# Patient Record
Sex: MALE
[De-identification: names, ages, dates, MRNs, and addresses within clinical notes are randomized per-mention and may not be internally consistent; named-entity substitution may affect disease eponyms.]

---

## 2017-07-09 ENCOUNTER — HOSPITAL ENCOUNTER (OUTPATIENT)
Dept: HOSPITAL 42 - ED | Age: 52
Setting detail: OBSERVATION
LOS: 2 days | Discharge: HOME | End: 2017-07-11
Attending: HOSPITALIST | Admitting: INTERNAL MEDICINE
Payer: COMMERCIAL

## 2017-07-09 VITALS — BODY MASS INDEX: 27.5 KG/M2

## 2017-07-09 DIAGNOSIS — G89.29: ICD-10-CM

## 2017-07-09 DIAGNOSIS — K52.9: ICD-10-CM

## 2017-07-09 DIAGNOSIS — I51.7: ICD-10-CM

## 2017-07-09 DIAGNOSIS — K64.9: ICD-10-CM

## 2017-07-09 DIAGNOSIS — N28.1: ICD-10-CM

## 2017-07-09 DIAGNOSIS — I26.99: Primary | ICD-10-CM

## 2017-07-09 DIAGNOSIS — F11.10: ICD-10-CM

## 2017-07-09 DIAGNOSIS — T40.605A: ICD-10-CM

## 2017-07-09 DIAGNOSIS — M25.511: ICD-10-CM

## 2017-07-09 DIAGNOSIS — K59.03: ICD-10-CM

## 2017-07-09 DIAGNOSIS — F41.0: ICD-10-CM

## 2017-07-09 DIAGNOSIS — R63.4: ICD-10-CM

## 2017-07-09 DIAGNOSIS — Z86.11: ICD-10-CM

## 2017-07-09 DIAGNOSIS — K30: ICD-10-CM

## 2017-07-09 DIAGNOSIS — Z87.891: ICD-10-CM

## 2017-07-09 DIAGNOSIS — F10.10: ICD-10-CM

## 2017-07-09 DIAGNOSIS — K57.30: ICD-10-CM

## 2017-07-09 DIAGNOSIS — M19.012: ICD-10-CM

## 2017-07-09 LAB
ALBUMIN SERPL-MCNC: 4.2 G/DL (ref 3–4.8)
ALBUMIN/GLOB SERPL: 1.6 {RATIO} (ref 1.1–1.8)
ALT SERPL-CCNC: 29 U/L (ref 7–56)
APPEARANCE UR: CLEAR
APTT BLD: 27.2 SECONDS (ref 23.7–30.8)
AST SERPL-CCNC: 29 U/L (ref 15–59)
BASOPHILS # BLD AUTO: 0.01 K/MM3 (ref 0–2)
BASOPHILS NFR BLD: 0.2 % (ref 0–3)
BILIRUB DIRECT SERPL-MCNC: 0.3 MG/DL (ref 0–0.4)
BILIRUB UR-MCNC: NEGATIVE MG/DL
BNP SERPL-MCNC: 89.4 PG/ML (ref 0–450)
BUN SERPL-MCNC: 9 MG/DL (ref 7–21)
CALCIUM SERPL-MCNC: 9.6 MG/DL (ref 8.4–10.5)
COLOR UR: YELLOW
EOSINOPHIL # BLD: 0 10*3/UL (ref 0–0.7)
EOSINOPHIL NFR BLD: 0.3 % (ref 1.5–5)
ERYTHROCYTE [DISTWIDTH] IN BLOOD BY AUTOMATED COUNT: 13.4 % (ref 11.5–14.5)
GFR NON-AFRICAN AMERICAN: > 60
GLUCOSE UR STRIP-MCNC: NEGATIVE MG/DL
GRANULOCYTES # BLD: 3.78 10*3/UL (ref 1.4–6.5)
GRANULOCYTES NFR BLD: 66 % (ref 50–68)
HGB BLD-MCNC: 13.1 GM/DL (ref 14–18)
INR PPP: 1.01 (ref 0.93–1.08)
LEUKOCYTE ESTERASE UR-ACNC: NEGATIVE LEU/UL
LIPASE SERPL-CCNC: 49 U/L (ref 23–300)
LYMPHOCYTES # BLD: 1.6 10*3/UL (ref 1.2–3.4)
LYMPHOCYTES NFR BLD AUTO: 27.6 % (ref 22–35)
MAGNESIUM SERPL-MCNC: 2.1 MG/DL (ref 1.7–2.2)
MCH RBC QN AUTO: 28.7 PG (ref 25–35)
MCHC RBC AUTO-ENTMCNC: 34.1 G/DL (ref 31–37)
MCV RBC AUTO: 84 FL (ref 80–105)
MONOCYTES # BLD AUTO: 0.3 10*3/UL (ref 0.1–0.6)
MONOCYTES NFR BLD: 5.9 % (ref 1–6)
PH UR STRIP: 8.5 [PH] (ref 4.7–8)
PLATELET # BLD: 177 10^3/UL (ref 120–450)
PMV BLD AUTO: 9.3 FL (ref 7–11)
PROT UR STRIP-MCNC: NEGATIVE MG/DL
PROTHROMBIN TIME: 10.9 SECONDS (ref 9.9–11.8)
RBC # BLD AUTO: 4.57 10^6/UL (ref 3.5–6.1)
RBC # UR STRIP: NEGATIVE /UL
SP GR UR STRIP: 1.01 (ref 1–1.03)
TROPONIN I SERPL-MCNC: < 0.01 NG/ML
URINE NITRATE: NEGATIVE
UROBILINOGEN UR STRIP-ACNC: 0.2 E.U./DL
WBC # BLD AUTO: 5.7 10^3/UL (ref 4.5–11)

## 2017-07-09 PROCEDURE — 85610 PROTHROMBIN TIME: CPT

## 2017-07-09 PROCEDURE — 73200 CT UPPER EXTREMITY W/O DYE: CPT

## 2017-07-09 PROCEDURE — 93005 ELECTROCARDIOGRAM TRACING: CPT

## 2017-07-09 PROCEDURE — 73030 X-RAY EXAM OF SHOULDER: CPT

## 2017-07-09 PROCEDURE — 82550 ASSAY OF CK (CPK): CPT

## 2017-07-09 PROCEDURE — 85300 ANTITHROMBIN III ACTIVITY: CPT

## 2017-07-09 PROCEDURE — 80053 COMPREHEN METABOLIC PANEL: CPT

## 2017-07-09 PROCEDURE — 99285 EMERGENCY DEPT VISIT HI MDM: CPT

## 2017-07-09 PROCEDURE — 83615 LACTATE (LD) (LDH) ENZYME: CPT

## 2017-07-09 PROCEDURE — 83880 ASSAY OF NATRIURETIC PEPTIDE: CPT

## 2017-07-09 PROCEDURE — 83735 ASSAY OF MAGNESIUM: CPT

## 2017-07-09 PROCEDURE — 96375 TX/PRO/DX INJ NEW DRUG ADDON: CPT

## 2017-07-09 PROCEDURE — 96361 HYDRATE IV INFUSION ADD-ON: CPT

## 2017-07-09 PROCEDURE — 85306 CLOT INHIBIT PROT S FREE: CPT

## 2017-07-09 PROCEDURE — 86147 CARDIOLIPIN ANTIBODY EA IG: CPT

## 2017-07-09 PROCEDURE — 81003 URINALYSIS AUTO W/O SCOPE: CPT

## 2017-07-09 PROCEDURE — 84484 ASSAY OF TROPONIN QUANT: CPT

## 2017-07-09 PROCEDURE — 86146 BETA-2 GLYCOPROTEIN ANTIBODY: CPT

## 2017-07-09 PROCEDURE — 81241 F5 GENE: CPT

## 2017-07-09 PROCEDURE — 86148 ANTI-PHOSPHOLIPID ANTIBODY: CPT

## 2017-07-09 PROCEDURE — 85246 CLOT FACTOR VIII VW ANTIGEN: CPT

## 2017-07-09 PROCEDURE — 85027 COMPLETE CBC AUTOMATED: CPT

## 2017-07-09 PROCEDURE — 96374 THER/PROPH/DIAG INJ IV PUSH: CPT

## 2017-07-09 PROCEDURE — 83690 ASSAY OF LIPASE: CPT

## 2017-07-09 PROCEDURE — 74177 CT ABD & PELVIS W/CONTRAST: CPT

## 2017-07-09 PROCEDURE — 85025 COMPLETE CBC W/AUTO DIFF WBC: CPT

## 2017-07-09 PROCEDURE — 84443 ASSAY THYROID STIM HORMONE: CPT

## 2017-07-09 PROCEDURE — 82248 BILIRUBIN DIRECT: CPT

## 2017-07-09 PROCEDURE — 36415 COLL VENOUS BLD VENIPUNCTURE: CPT

## 2017-07-09 PROCEDURE — 85730 THROMBOPLASTIN TIME PARTIAL: CPT

## 2017-07-09 PROCEDURE — 85303 CLOT INHIBIT PROT C ACTIVITY: CPT

## 2017-07-09 PROCEDURE — 71260 CT THORAX DX C+: CPT

## 2017-07-09 PROCEDURE — 85305 CLOT INHIBIT PROT S TOTAL: CPT

## 2017-07-09 RX ADMIN — HEPARIN SODIUM AND DEXTROSE PRN MLS/HR: 5000; 5 INJECTION INTRAVENOUS at 20:12

## 2017-07-09 NOTE — ED PDOC
Arrival/HPI





- General


Chief Complaint: Shortness Of Breath


Time Seen by Provider: 07/09/17 15:04


Historian: Patient





- History of Present Illness


Narrative History of Present Illness (Text): 





07/09/17 15:25


Edi Cordova is a 52 year old male, with a history of right shoulder surgery 

on 15 mg percocet, and anxiety on Xanax, presents to the emergency department 

complaining of dizziness and shortness of breath for past 2-3 days. Patient 

became dizzy yesterday while taking a shower and states he twisted his shoulder 

after holding on to a handle to prevent the fall.  Denies any LOC, head trauma, 

or weakness/numbness of extremity. Patient finished a course of Zithromax today 

for bronchitis. Patient also reports that he has difficulty breathing and chest 

pain which is worsened with deep inspiration. He has a recent stress test which

, according to patient, showed" enlarged left ventricle." He also notes of 

hypotension with a pressure of 88/50 which was measured a week ago at PMD's 

office. He also reports of decreased appetite over the last few months, and 

informs of a weight loss of 20 pounds in 2 months. States he feels depressed, 

but denies any suicidal or homicidal ideation. Patient was started on Lexapro 

for depression by PMD, but was discontinued due to worsening panic attacks. 

Denies fever, chills, headache, nausea, vomiting, urinary symptoms, or any 

other complaints at this time. 





Time/Duration: < week (2-3 days )


Symptom Onset: Gradual


Severity Level: Mild


Context: Home





Past Medical History





- Provider Review


Nursing Documentation Reviewed: Yes





- Infectious Disease


Hx of Infectious Diseases: None





- Tetanus Immunization


Tetanus Immunization: Unknown





- Past Medical History


Past Medical History: No Previous





- Cardiac


Hx Cardiac Disorders: No





- Pulmonary


Hx Respiratory Disorders: No


Hx Bronchitis: Yes





- Neurological


Hx Neurological Disorder: No





- HEENT


Hx HEENT Disorder: No





- Renal


Hx Renal Disorder: No





- Endocrine/Metabolic


Hx Endocrine Disorders: No





- Hematological/Oncological


Hx Blood Disorders: No





- Integumentary


Hx Dermatological Disorder: No





- Musculoskeletal/Rheumatological


Hx Musculoskeletal Disorders: Yes


Other/Comment: left torn rotator cuff





- Gastrointestinal


Hx Gastrointestinal Disorders: No





- Genitourinary/Gynecological


Hx Genitourinary Disorders: No





- Psychiatric


Hx Psychophysiologic Disorder: No


Hx Depression: No


Hx Emotional Abuse: No


Hx Physical Abuse: No


Hx Substance Use: No





- Past Surgical History


Past Surgical History: No Previous





- Surgical History


Hx Cardiac Catheterization: Yes


Hx Eye Surgery: Yes (childhood)


Hx Tonsillectomy: Yes


Other/Comment: hemorrhoid surgery, 5 avusion fx, right rotator cuff surgery





- Anesthesia


Hx Anesthesia: Yes


Hx Anesthesia Reactions: No





- Suicidal Assessment


Feels Threatened In Home Enviroment: No





Family/Social History





- Physician Review


Nursing Documentation Reviewed: Yes


Family/Social History: No Known Family HX


Smoking Status: Current Some Days Smoker


Hx Alcohol Use: Yes


Amount per day: 2


Hx Substance Use: No


Hx Substance Use Treatment: No





Allergies/Home Meds


Allergies/Adverse Reactions: 


Allergies





amoxicillin trihydrate [From Augmentin] Allergy (Verified 07/09/17 15:10)


 ANAPHYLAXIS


potassium clavulanate [From Augmentin] Allergy (Verified 07/09/17 15:10)


 ANAPHYLAXIS


prednimustine Allergy (Verified 07/09/17 18:26)


 RASH


prednisolone Allergy (Verified 07/09/17 18:26)


 RASH


prednisolone acetate, micronized Allergy (Verified 07/09/17 18:26)


 RASH


prednisone Allergy (Verified 07/09/17 18:26)


 RASH


prednylidene Allergy (Verified 07/09/17 18:26)


 RASH


prednison Allergy (Uncoded 07/09/17 18:26)


 RASH








Home Medications: 


 Home Meds











 Medication  Instructions  Recorded  Confirmed


 


ALPRAZolam HALF TABLET [Xanax] 1 mg PO DAILY 02/27/17 07/09/17


 


Oxycodone HCl [Roxicodone] 15 mg PO Q6 PRN 02/27/17 07/09/17














Review of Systems





- Physician Review


All systems were reviewed & negative as marked: Yes





- Review of Systems


Constitutional: Normal.  absent: Fatigue, Fevers


Respiratory: SOB.  absent: Cough, Sputum


Cardiovascular: Chest Pain, Other (near syncope).  absent: Palpitations


Gastrointestinal: Diarrhea, Appetite Changes (Loss of appetite; weight loss ).  

absent: Abdominal Pain, Nausea, Vomiting


Genitourinary Male: Normal.  absent: Dysuria


Musculoskeletal: Back Pain, Other (left shoulder pain )


Neurological: Dizziness.  absent: Focal Weakness


Psychiatric: Depression.  absent: Suicidal Ideation





Physical Exam


Vital Signs Reviewed: Yes


Vital Signs











  Temp Pulse Resp BP Pulse Ox


 


 07/09/17 17:40   48 L  18  130/65  98


 


 07/09/17 16:39   56 L  18  131/69  98


 


 07/09/17 16:27   56 L  18  152/62 H  100


 


 07/09/17 15:06  97.8 F  76  14  156/66 H  100











Temperature: Afebrile


Blood Pressure: Hypertensive


Pulse: Regular


Respiratory Rate: Normal


Appearance: Positive for: Non-Toxic, Uncomfortable


Pain Distress: None


Mental Status: Positive for: Alert and Oriented X 3





- Systems Exam


Head: Present: Atraumatic, Normocephalic


Pupils: Present: PERRL


Conjunctiva: Present: Normal


Mouth: Present: Moist Mucous Membranes


Pharnyx: Present: Normal.  No: ERYTHEMA, EXUDATE


Respiratory/Chest: Present: Clear to Auscultation, Good Air Exchange.  No: 

Respiratory Distress, Accessory Muscle Use


Cardiovascular: Present: Regular Rate and Rhythm, Normal S1, S2.  No: Murmurs


Abdomen: Present: Normal Bowel Sounds.  No: Tenderness, Distention, Peritoneal 

Signs, Rebound, Guarding


Upper Extremity: Present: Normal ROM (Full active and passive range of motion 

at the shoulder ), NORMAL PULSES, Tenderness (Tenderness to palpation of left 

posterior shoulder ), Neurovascularly Intact.  No: Cyanosis, Edema, Swelling, 

Temperature Abnormalties, Deformity


Lower Extremity: Present: Normal Inspection.  No: Edema


Neurological: Present: GCS=15, CN II-XII Intact, Speech Normal, Motor Func 

Grossly Intact, Normal Sensory Function


Skin: Present: Warm, Dry, Normal Color.  No: Rashes


Psychiatric: Present: Alert, Oriented x 3, Normal Insight, Normal Concentration





Medical Decision Making


ED Course and Treatment: 





07/09/17 15:43


Impression: A 52 year old  male who presents to the emergency department 

complaining of dizziness, left shoulder, shortness of breath and chest pain for 

past few days. 








Differential Diagnosis include but are not limited to:  





Malignancy vs PE vs anxiety





Plan:


-- CT chest, Abd/Pel


-- Labs, cardiac enzymes


-- TSH


-- Percocet


-- Pepcid


-- Toradol


-- IVF 


-- Left shoudler X-ray


-- Urinalysis


-- Reassess and disposition


-- Spoke with his pmd, Dr. Yancey - plan will be to f/u results and admit.


Progress Notes:


07/09/17 18:59








 


 EXAM:  


   CT Abdomen and Pelvis With Intravenous Contrast  


 


 FINDINGS:  


 


  ABDOMEN:  


   Liver:  A few small calcifications are visualized within the liver,   


 suggestive of granulomatous disease. No mass.  


   Gallbladder and bile ducts:  No calcified stones.  No ductal dilation.  


   Pancreas:  There is hypodense fatty infiltration of the head of the 

pancreas.  


   Spleen:  No splenomegaly.  


   Adrenals:  No mass.  


   Kidneys and ureters:  There is an exophytic hypodense left renal cyst   


 measuring 5.4 cm in diameter.  No hydronephrosis bilaterally.  


   Stomach and bowel:  There is wall thickening of the sigmoid colon and rectum

,   


 suggestive of proctocolitis. Colonic diverticula are also visualized.    


 Additional pathology cannot be excluded.  


   Appendix:  No findings to suggest acute appendicitis.  


 


  PELVIS:  


   Bladder:  No mass.  


   Reproductive:  A tiny calcification is visualized within the prostate.  


 


  ABDOMEN and PELVIS:  


   Intraperitoneal space:  No free air.  


   Bones/joints:  Hypertrophic degenerative changes are noted within the spine.

  


   Vasculature:  No abdominal aortic aneurysm.  


   Lymph nodes:  There is no significant retroperitoneal or intrapelvic   


 lymphadenopathy.  


 


 IMPRESSION:       


 1.  There is wall thickening of the sigmoid colon and rectum, suggestive of   


 proctocolitis.  Colonic diverticula are also visualized.  Additional pathology

   


 cannot be excluded.  If further evaluation is clinically indicated, 

colonoscopy   


 is recommended.  


 2.  There is an exophytic hypodense left renal cyst measuring 5.4 cm in   


 diameter.  


 3.  Additional CT findings described above.  


 _______________________________________________  


 


 EXAM:  


   CT Chest With Intravenous Contrast  


 


 FINDINGS:  


   Lungs:  On series 4 image 61, there is a 3-4mm left upper lobe nodule.    


 Within the right middle lobe of the lung on series 4 image 92, there is a 9 mm

   


 hyperdense calcified nodule.  No lung mass.  Within the left lower lobe on   


 series 4 image 87, there is an equivocal 3-4 mm nodule.  Small biapical bullae

   


 are visualized.  


   Pleural space:  No pneumothorax.  No significant effusion.  


   Heart:  There is a small pericardial effusion anteriorly.  Fluid is   


 visualized within the superior pericardial recess.  


   Bones/joints:  Hypertrophic degenerative changes are noted within the spine.

  


   Vasculature:  There is absence of normal enhancement within segmental   


 branches to the right middle lobe, concerning for pulmonary embolism.  No 

acute   


 central pulmonary embolism.  


   Lymph nodes:  No significant mediastinal lymphadenopathy.  Small hilar lymph

   


 nodes are identified, without significant lymphadenopathy.  


 


 IMPRESSION:       


 1.  There is absence of normal enhancement within segmental branches to the   


 right middle lobe, concerning for pulmonary embolism.    


 2.  Small noncalcified pulmonary nodule(s). If this patient is at low risk for

   


 a primary malignancy, then no follow-up is required.  If this patient is at   


 high risk for a primary maligancy, then a follow-up noncontrast chest CT is   


 recommended at 12 months.  If unchanged at that time, then no follow-up is   


 required.  


 Fleischner Society Recommendations. Incidental Pulmonary Nodule Follow-up.   


 Radiology, 2005 Nov;237(2):395-400.  


 3.  There is a small pericardial effusion anteriorly.    


 4.  Within the right middle lobe of the lung on series 4 image 92, there is a 

9   


 mm hyperdense calcified nodule.   


 


 


EXAM:  


   CT Abdomen and Pelvis With Intravenous Contrast  





 FINDINGS:  


 


  ABDOMEN:  


   Liver:  A few small calcifications are visualized within the liver,   


 suggestive of granulomatous disease. No mass.  


   Gallbladder and bile ducts:  No calcified stones.  No ductal dilation.  


   Pancreas:  There is hypodense fatty infiltration of the head of the 

pancreas.  


   Spleen:  No splenomegaly.  


   Adrenals:  No mass.  


   Kidneys and ureters:  There is an exophytic hypodense left renal cyst   


 measuring 5.4 cm in diameter.  No hydronephrosis bilaterally.  


   Stomach and bowel:  There is wall thickening of the sigmoid colon and rectum

,   


 suggestive of proctocolitis. Colonic diverticula are also visualized.    


 Additional pathology cannot be excluded.  


   Appendix:  No findings to suggest acute appendicitis.  


 


  PELVIS:  


   Bladder:  No mass.  


   Reproductive:  A tiny calcification is visualized within the prostate.  


 


  ABDOMEN and PELVIS:  


   Intraperitoneal space:  No free air.  


   Bones/joints:  Hypertrophic degenerative changes are noted within the spine.

  


   Vasculature:  No abdominal aortic aneurysm.  


   Lymph nodes:  There is no significant retroperitoneal or intrapelvic   


 lymphadenopathy.  


 


 IMPRESSION:       


 1.  There is wall thickening of the sigmoid colon and rectum, suggestive of   


 proctocolitis.  Colonic diverticula are also visualized.  Additional pathology

   


 cannot be excluded.  If further evaluation is clinically indicated, 

colonoscopy   


 is recommended.  


 2.  There is an exophytic hypodense left renal cyst measuring 5.4 cm in   


 diameter.  


 3.  Additional CT findings described above.  


 _______________________________________________  


 


 EXAM:  


   CT Chest With Intravenous Contrast  


 


 FINDINGS:  


   Lungs:  On series 4 image 61, there is a 3-4mm left upper lobe nodule.    


 Within the right middle lobe of the lung on series 4 image 92, there is a 9 mm

   


 hyperdense calcified nodule.  No lung mass.  Within the left lower lobe on   


 series 4 image 87, there is an equivocal 3-4 mm nodule.  Small biapical bullae

   


 are visualized.  


   Pleural space:  No pneumothorax.  No significant effusion.  


   Heart:  There is a small pericardial effusion anteriorly.  Fluid is   


 visualized within the superior pericardial recess.  


   Bones/joints:  Hypertrophic degenerative changes are noted within the spine.

  


   Vasculature:  There is absence of normal enhancement within segmental   


 branches to the right middle lobe, concerning for pulmonary embolism.  No 

acute   


 central pulmonary embolism.  


   Lymph nodes:  No significant mediastinal lymphadenopathy.  Small hilar lymph

   


 nodes are identified, without significant lymphadenopathy.  


 


 IMPRESSION:       


 1.  There is absence of normal enhancement within segmental branches to the   


 right middle lobe, concerning for pulmonary embolism.    


 2.  Small noncalcified pulmonary nodule(s). If this patient is at low risk for

   


 a primary malignancy, then no follow-up is required.  If this patient is at   


 high risk for a primary maligancy, then a follow-up noncontrast chest CT is   


 recommended at 12 months.  If unchanged at that time, then no follow-up is   


 required.  


 Fleischner Society Recommendations. Incidental Pulmonary Nodule Follow-up.   


 Radiology, 2005 Nov;237(2):395-400.  


 3.  There is a small pericardial effusion anteriorly.    


 4.  Within the right middle lobe of the lung on series 4 image 92, there is a 

9   


 mm hyperdense calcified nodule.   


 


 Dictated By:  Jareth Phelan MD, MD      


 Dictated Date/Time:  07/09/17 1850  


 Signed By: Jareth Richardson MD  


                                                 








07/09/17 19:27


Patient with noted history.  Given sob and weight loss, CT c/a/p ordered, 

showing PE and proctocolitis.  Will start on heparin and abx - will need to be 

admitted - will need pulmonary, hematology, and GI consult.  Case discussed 

with Dr. Arias who will admit the patient to the hospitalist service.  





- Lab Interpretations


Lab Results: 








 07/09/17 15:38 





 07/09/17 15:38 





 Lab Results





07/09/17 16:00: Urine Color Yellow, Urine Appearance Clear, Urine pH 8.5, Ur 

Specific Gravity 1.010, Urine Protein Negative, Urine Glucose (UA) Negative, 

Urine Ketones Negative, Urine Blood Negative, Urine Nitrate Negative, Urine 

Bilirubin Negative, Urine Urobilinogen 0.2, Ur Leukocyte Esterase Negative


07/09/17 15:38: TSH 3rd Generation 0.83


07/09/17 15:38: Sodium 142, Potassium 4.0, Chloride 106, Carbon Dioxide 27, 

Anion Gap 13, BUN 9, Creatinine 0.9, Est GFR (African Amer) > 60, Est GFR (Non-

Af Amer) > 60, Random Glucose 100, Calcium 9.6, Magnesium 2.1, Total Bilirubin 

0.5, Direct Bilirubin 0.3, AST 29, ALT 29, Alkaline Phosphatase 44, Lactate 

Dehydrogenase 318 L, Total Creatine Kinase 85, Troponin I < 0.01, NT-Pro-B 

Natriuret Pep 89.4, Total Protein 6.8, Albumin 4.2, Globulin 2.6, Albumin/

Globulin Ratio 1.6, Lipase 49


07/09/17 15:38: PT 10.9, INR 1.01, APTT 27.2


07/09/17 15:38: WBC 5.7, RBC 4.57, Hgb 13.1 L, Hct 38.4 L, MCV 84.0, MCH 28.7, 

MCHC 34.1, RDW 13.4, Plt Count 177, MPV 9.3, Gran % 66.0, Lymph % (Auto) 27.6, 

Mono % (Auto) 5.9, Eos % (Auto) 0.3 L, Baso % (Auto) 0.2, Gran # 3.78, Lymph # 

1.6, Mono # 0.3, Eos # 0.0, Baso # 0.01








I have reviewed the lab results: Yes





- RAD Interpretation


Narrative RAD Interpretations (Text): 





07/09/17 19:33


L shoulder: no acute findings


Radiology Orders: 








07/09/17 15:27


SHOULDER LEFT [RAD] Stat 





07/09/17 15:30


CHEST,ABD,PEL W/IV&PO CONTRAST [CT] Stat 











: Radiologist





- EKG Interpretation


EKG Interpretation (Text): 





07/09/17 19:32


NSR @ 68; no ST/T changes; normal intervals; normal axis.





- Medication Orders


Current Medication Orders: 








Heparin Sodium/Dextrose (Heparin 25,000 Units/250ml In D5w)  25,000 units in 

250 mls @ 17.023 mls/hr IV .[id number] PRN; Protocol; 18 UNITS/KG/HR


   PRN Reason: ADJUST RATE PER PROTOCOL





Discontinued Medications





Famotidine (Pepcid)  20 mg IVP STAT STA


   Stop: 07/09/17 15:32


   Last Admin: 07/09/17 16:03  Dose: 20 mg





Heparin Sodium (Porcine) (Heparin)  7,600 units 80 units/kg (7600 units) IV 

ONCE ONE


   PRN Reason: Protocol


   Stop: 07/09/17 19:21


Sodium Chloride (Sodium Chloride 0.9%)  1,000 mls @ 999 mls/hr IV .Q1H1M STA


   Stop: 07/09/17 16:31


   Last Admin: 07/09/17 16:01  Dose: 999 mls/hr





Iohexol (Omnipaque 240 (50 Ml)) Confirm Administered Dose 50 ml .ROUTE .STK-MED 

ONE


   Stop: 07/09/17 16:10


Iohexol (Omnipaque 350 100 Ml) Confirm Administered Dose 350 mg .ROUTE .STK-MED 

ONE


   Stop: 07/09/17 16:10


Iohexol (Omnipaque 350 150 Ml) Confirm Administered Dose 150 ml .ROUTE .STK-MED 

ONE


   Stop: 07/09/17 18:19


Ketorolac Tromethamine (Toradol)  30 mg IVP STAT STA


   Stop: 07/09/17 15:32


   Last Admin: 07/09/17 16:03 Dose:  Not Given


   Non-Admin Reason: Patient Refused





Morphine Sulfate (Morphine)  4 mg IVP STAT STA


   Stop: 07/09/17 16:27


   Last Admin: 07/09/17 16:32  Dose: 4 mg





Oxycodone/Acetaminophen (Percocet 10/325 Mg Tab)  1 tab PO STAT STA


   Stop: 07/09/17 15:32


   Last Admin: 07/09/17 16:01  Dose: 1 tab











- Scribe Statement


The provider has reviewed the documentation as recorded by the [place]ia Vazquez 


Provider Attestation: 


Roxane Vazquez 





Provider Scribe Attestation:


All medical record entries made by the Bruceibflor were at my direction and 

personally dictated by me. I have reviewed the chart and agree that the record 

accurately reflects my personal performance of the history, physical exam, 

medical decision making, and the department course for this patient. I have 

also personally directed, reviewed, and agree with the discharge instructions 

and disposition.











Disposition/Present on Arrival





- Present on Arrival


Any Indicators Present on Arrival: No


History of DVT/PE: No


History of Uncontrolled Diabetes: No


Urinary Catheter: No


History of Decub. Ulcer: No


History Surgical Site Infection Following: None





- Disposition


Have Diagnosis and Disposition been Completed?: Yes


Diagnosis: 


 Pulmonary embolism, Near syncope, Colitis





Disposition: HOSPITALIZED


Disposition Time: 19:15


Patient Plan: Admission


Condition: FAIR


Referrals: 


Lamberto Yancey MD [Primary Care Provider] - Follow up with primary

## 2017-07-09 NOTE — CP.PCM.HP
<RenettaNba - Last Filed: 07/10/17 07:05>





History of Present Illness





- History of Present Illness


History of Present Illness: 








CC: Shoulder pain and SOB





Mr. Romero is a 53 y/o male with a pertinent PMHx of tobacco abuse and right 

sided shoulder pain for which he takes Percocet 15, who presented with a c/o 

SOB of 2 months duration on and off, worsening over the past 2-3 days.  Mr. Romero was recently seen for bronchitis and finished a course of ABx (

Zithromax) on the day of admission (7/09/17).  Patient stated that the pain got 

worse with inspiration and that nothing made it better.  Patient denied a 

history of clots, as well as a history of fever, chills, nausea, vomiting, 

urinary symptoms, or headaches.





Patient further complained of pain in his right shoulder s/p catching a fall in 

the shower on Saturday.  Patient received dilaudid in the ED for this pain.  

Patient denied any head trauma, focal weakness, loss of sensation, and any 

other symptoms.





Of note, patient had a recent stress test which showed an enlarged left 

ventricle, per the patient.  Cardio is on consult.  Finally, patient also 

stated that he lost 80 pounds, unintentionally over the past year.  Heme onc is 

on consult





PMHx: As above


PSHx: Rotator cuff surgery


All: Prednisone, Augmentin


SocHx: .5ppd for 30 years; cocaine, heroin in the past; drinks occasionally


Meds: Percocet 15 mg; Xanax


FamHx: Non-contributory





Present on Admission





- Present on Admission


Any Indicators Present on Admission: No





Review of Systems





- Review of Systems


Review of Systems: 





Constitutional: pt denies fever, chills, generalized weakness


ENT: pt denies dysphagia, ofalgia, hearing deficit, rhinorrhea


Eyes: pt denies sudden loss of vision, diplopia, blurred vision


MSK: + shoulder joint pain, denies extremity cramping


Cardio: +sob, +cp; pt deniesheart murmur; see hpi


Pulm: +sob, pt denies cough, hemoptysis, wheeze


GI: pt denies loss of appetite, abdominal pain, constipation, melena, n/v/d


: pt denies burning on urination, urinary frequency, hematuria, urinary 

urgency


Neuro: pt denies paresis, paresthesia, dizziness, ha, numbness, tingling


Derm: pt denies skin changes, lesions, nail changes


Endo: pt denies intolerance to heat/cold, diaphoresis, night sweats, polydipsia


Psych: +anxiety; pt denies depression, mood changes





Past Patient History





- Infectious Disease


Hx of Infectious Diseases: None





- Tetanus Immunizations


Tetanus Immunization: Unknown





- Past Medical History & Family History


Past Medical History?: Yes





- Past Social History


Smoking Status: Current Some Days Smoker





- CARDIAC


Hx Cardiac Disorders: No





- PULMONARY


Hx Respiratory Disorders: No


Hx Bronchitis: Yes





- NEUROLOGICAL


Hx Neurological Disorder: No





- HEENT


Hx HEENT Problems: No





- RENAL


Hx Chronic Kidney Disease: No





- ENDOCRINE/METABOLIC


Hx Endocrine Disorders: No





- HEMATOLOGICAL/ONCOLOGICAL


Hx Blood Disorders: No





- INTEGUMENTARY


Hx Dermatological Problems: No





- MUSCULOSKELETAL/RHEUMATOLOGICAL


Hx Falls: No





- GASTROINTESTINAL


Hx Gastrointestinal Disorders: No





- GENITOURINARY/GYNECOLOGICAL


Hx Genitourinary Disorders: No





- PSYCHIATRIC


Hx Psychophysiologic Disorder: No


Hx Depression: No


Hx Emotional Abuse: No


Hx Physical Abuse: No





- SURGICAL HISTORY


Hx Cardiac Catheterization: Yes


Other/Comment: hemorrhoid surgery, 5 avusion fx, right rotator cuff surgery





- ANESTHESIA


Hx Anesthesia: Yes


Hx Anesthesia Reactions: No





Meds


Allergies/Adverse Reactions: 


 Allergies











Allergy/AdvReac Type Severity Reaction Status Date / Time


 


amoxicillin trihydrate Allergy  ANAPHYLAXIS Verified 07/09/17 15:10





[From Augmentin]     


 


potassium clavulanate Allergy  ANAPHYLAXIS Verified 07/09/17 15:10





[From Augmentin]     


 


prednimustine Allergy  RASH Verified 07/09/17 18:26


 


prednisolone Allergy  RASH Verified 07/09/17 18:26


 


prednisolone acetate, Allergy  RASH Verified 07/09/17 18:26





micronized     


 


prednisone Allergy  RASH Verified 07/09/17 18:26


 


prednylidene Allergy  RASH Verified 07/09/17 18:26


 


prednison Allergy  RASH Uncoded 07/09/17 18:26














Physical Exam





- Constitutional


Additional comments: 





VS as above


Constitutional: a&o x 4, nad


Head and Neck: neck supple, no jvd, trachea midline, carotid midline, no 

cervical/head mass


Eyes: elias, nonicteric sclera, eom intact


ENT: auditory acuity grossly intact, throat not congested, no nasal deformity


Cardio: rrr, no m/r/g, no carotid bruit, nml s1, s2


Pulm: no accessory muscle use, equal nml breath sounds bilaterally, ctab


Abd: s/nt/nd, nbs x 4 q, no palpable masses


Derm: no rashes, no ulcers, no lesions


Extr: no edema, no cyanosis, no calf tenderness, no lesions, no varicosities


Neuro: cn II-XII grossly intact, ue and le 3+ muscle strength bilaterally, no 

los ue, le bilaterally and core





Results





- Vital Signs


Recent Vital Signs: 





 Last Vital Signs











Temp  97.8 F   07/09/17 15:06


 


Pulse  48 L  07/09/17 17:40


 


Resp  18   07/09/17 20:17


 


BP  130/65   07/09/17 17:40


 


Pulse Ox  98   07/09/17 17:40














- Labs


Result Diagrams: 


 07/09/17 15:38





 07/09/17 15:38





Assessment & Plan





- Assessment and Plan (Free Text)


Assessment: 


Mr. Romero is a 53 y/o male with a pertinent PMHx of tobacco abuse and right 

sided shoulder pain for which he takes Percocet 15, who presented with a c/o 

SOB of 2 months duration on and off, worsening over the past 2-3 days.


A/P:


1.) SOB 2/2 pulmonary embolism


   a.) heparin drip


   b.) pulm on consult


   c.) f/u with PMD


2.) CP likely 2/2 #1 above, VS unknown etiology


   a.) Cardio on consult


3.) Unprovoked weight loss possibly 2/2 malignancy VS anxiety VS colitis VS 

unknown etiology


   a.) uncalcified pulmonary nodules identified on CT   


   b.) colon inflammation on CT


   c.) patient has a hx of anxiety


   d.) heme onc on consult


   e.) gi on consult


   f.) pulm on consult


4.) Hx/O shoulder pain


   a.) pt on home Percocet


5.) Hx/O Tobacco abuse


   a.) advise patient on tobacco cessation


6.) GI PPHXS


   a.) Protonix


7.) DVT PPHXS


   a.) Heparin drip





<Kaela Arias - Last Filed: 07/10/17 20:10>





Results





- Vital Signs


Recent Vital Signs: 





 Last Vital Signs











Temp  98.9 F   07/10/17 18:00


 


Pulse  50 L  07/10/17 18:07


 


Resp  16   07/10/17 18:00


 


BP  123/72   07/10/17 18:00


 


Pulse Ox  99   07/10/17 00:01














- Labs


Result Diagrams: 


 07/10/17 05:30





 07/10/17 05:30


Labs: 





 Laboratory Results - last 24 hr











  07/10/17 07/10/17 07/10/17





  02:15 05:30 05:30


 


WBC   5.7 


 


RBC   4.12 


 


Hgb   11.8 L 


 


Hct   34.9 L 


 


MCV   84.7 


 


MCH   28.6 


 


MCHC   33.8 


 


RDW   13.7 


 


Plt Count   147 


 


MPV   9.2 


 


Gran %   59.0 


 


Lymph % (Auto)   30.9 


 


Mono % (Auto)   8.1 H 


 


Eos % (Auto)   1.8 


 


Baso % (Auto)   0.2 


 


Gran #   3.36 


 


Lymph #   1.8 


 


Mono #   0.5 


 


Eos #   0.1 


 


Baso #   0.01 


 


APTT  136.8 H*  


 


Sodium    141


 


Potassium    4.3


 


Chloride    108 H


 


Carbon Dioxide    25


 


Anion Gap    12


 


BUN    12


 


Creatinine    0.9


 


Est GFR ( Amer)    > 60


 


Est GFR (Non-Af Amer)    > 60


 


Random Glucose    97


 


Calcium    8.8


 


Total Bilirubin    0.4


 


AST    16


 


ALT    23


 


Alkaline Phosphatase    44


 


Total Protein    6.0


 


Albumin    3.6


 


Globulin    2.4


 


Albumin/Globulin Ratio    1.5














  07/10/17 07/10/17





  10:30 15:37


 


WBC  


 


RBC  


 


Hgb  


 


Hct  


 


MCV  


 


MCH  


 


MCHC  


 


RDW  


 


Plt Count  


 


MPV  


 


Gran %  


 


Lymph % (Auto)  


 


Mono % (Auto)  


 


Eos % (Auto)  


 


Baso % (Auto)  


 


Gran #  


 


Lymph #  


 


Mono #  


 


Eos #  


 


Baso #  


 


APTT  51.4 H  54.0 H


 


Sodium  


 


Potassium  


 


Chloride  


 


Carbon Dioxide  


 


Anion Gap  


 


BUN  


 


Creatinine  


 


Est GFR ( Amer)  


 


Est GFR (Non-Af Amer)  


 


Random Glucose  


 


Calcium  


 


Total Bilirubin  


 


AST  


 


ALT  


 


Alkaline Phosphatase  


 


Total Protein  


 


Albumin  


 


Globulin  


 


Albumin/Globulin Ratio  














Attending/Attestation





- Attestation


I have personally seen and examined this patient.: Yes


I have fully participated in the care of the patient.: Yes


I have reviewed all pertinent clinical information: Yes


Notes (Text): 





07/10/17 20:08


Patient was seen when he was in bed # 2 in the ER .


Agree with history , physical examination, assessment and plan.





52 year old white male with history of Weightloss of 80 lbs in one year,


bronchitis, irregular heart beat, allergies to multiple meds, reading glasses,


difficulty in hearing , seasonal allergies, anemia , endoscopy and colonoscopy


(Normal) done 6 years ago by ,spinal arthritis, C3-C6 fracture,


right shoulder rotator cuff surgery, tonsillectomy,bilateral hernia repair as 

an 


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 

depression, 


panic attacks, giant cell arteritis, smoking, alcohol use, marijuana use, 


comes in with complaints of sob, chest pain, dizziness,weightloss,CT abdomen,


pelvis , chest were done which reveals right middle lobe pulmonary


emboism and colitis,left renal cyst , small pericardial effusion, calcified 

nodule.


Primary physician at [facility].Cardiologist is . Was also seen 

by 


.

## 2017-07-10 LAB
ALBUMIN SERPL-MCNC: 3.6 G/DL (ref 3–4.8)
ALBUMIN/GLOB SERPL: 1.5 {RATIO} (ref 1.1–1.8)
ALT SERPL-CCNC: 23 U/L (ref 7–56)
AST SERPL-CCNC: 16 U/L (ref 15–59)
BASOPHILS # BLD AUTO: 0.01 K/MM3 (ref 0–2)
BASOPHILS NFR BLD: 0.2 % (ref 0–3)
BUN SERPL-MCNC: 12 MG/DL (ref 7–21)
CALCIUM SERPL-MCNC: 8.8 MG/DL (ref 8.4–10.5)
EOSINOPHIL # BLD: 0.1 10*3/UL (ref 0–0.7)
EOSINOPHIL NFR BLD: 1.8 % (ref 1.5–5)
ERYTHROCYTE [DISTWIDTH] IN BLOOD BY AUTOMATED COUNT: 13.7 % (ref 11.5–14.5)
GFR NON-AFRICAN AMERICAN: > 60
GRANULOCYTES # BLD: 3.36 10*3/UL (ref 1.4–6.5)
GRANULOCYTES NFR BLD: 59 % (ref 50–68)
HGB BLD-MCNC: 11.8 GM/DL (ref 14–18)
LYMPHOCYTES # BLD: 1.8 10*3/UL (ref 1.2–3.4)
LYMPHOCYTES NFR BLD AUTO: 30.9 % (ref 22–35)
MCH RBC QN AUTO: 28.6 PG (ref 25–35)
MCHC RBC AUTO-ENTMCNC: 33.8 G/DL (ref 31–37)
MCV RBC AUTO: 84.7 FL (ref 80–105)
MONOCYTES # BLD AUTO: 0.5 10*3/UL (ref 0.1–0.6)
MONOCYTES NFR BLD: 8.1 % (ref 1–6)
PLATELET # BLD: 147 10^3/UL (ref 120–450)
PMV BLD AUTO: 9.2 FL (ref 7–11)
RBC # BLD AUTO: 4.12 10^6/UL (ref 3.5–6.1)
WBC # BLD AUTO: 5.7 10^3/UL (ref 4.5–11)

## 2017-07-10 RX ADMIN — OXYCODONE HYDROCHLORIDE PRN MG: 15 TABLET ORAL at 05:12

## 2017-07-10 RX ADMIN — OXYCODONE HYDROCHLORIDE PRN MG: 15 TABLET ORAL at 17:52

## 2017-07-10 RX ADMIN — OXYCODONE HYDROCHLORIDE PRN MG: 15 TABLET ORAL at 11:34

## 2017-07-10 RX ADMIN — HEPARIN SODIUM AND DEXTROSE PRN MLS/HR: 5000; 5 INJECTION INTRAVENOUS at 11:41

## 2017-07-10 RX ADMIN — OXYCODONE HYDROCHLORIDE PRN MG: 15 TABLET ORAL at 22:57

## 2017-07-10 RX ADMIN — PANTOPRAZOLE SODIUM SCH MG: 20 TABLET, DELAYED RELEASE ORAL at 17:52

## 2017-07-10 NOTE — RAD
PROCEDURE:  Radiographs of the Left Shoulder



HISTORY:

L shoulder pain post fall







COMPARISON:

Comparison made with CT scan chest 07/09/2017 which image the left 

shoulder in 3 planes. 



FINDINGS:



BONES:

No evidence of acute displaced fracture nor dislocation



JOINTS:

Mild degenerative changes of the left acromioclavicular joint. 



SOFT TISSUES:

Normal.



OTHER FINDINGS:

None. 



IMPRESSION:

Mild DJD left acromioclavicular joint. If symptoms persist or occult 

left shoulder showed shoulder

## 2017-07-10 NOTE — CARD
--------------- APPROVED REPORT --------------





EKG Measurement

Heart [id number]

MT [id number]

[id number]

[id number]

[id number]



<Conclusion>

Marked sinus bradycardia with 1st degree AV block

Abnormal ECG

## 2017-07-10 NOTE — CARD
--------------- APPROVED REPORT --------------





EKG Measurement

Heart [id number]

[place] [id number]

[id number]

[id number]

[id number]



<Conclusion>

Normal sinus rhythm

Normal ECG

No change

## 2017-07-10 NOTE — CT
PROCEDURE:  CT of the left shoulder without contrast



HISTORY:

r/o rotator cuff tear



COMPARISON:





TECHNIQUE:

CT of the left shoulder was performed in the axial plane with 

sagittal and coronal reconstructions.



FINDINGS:

There is no obvious rotator cuff tear. There is no retraction or 

atrophy of the cuff. There are no significant degenerative changes 

arising from the acromion.



There are no bony degenerative changes in the glenohumeral joint.



IMPRESSION:

Negative study

## 2017-07-10 NOTE — CP.PCM.CON
<Nayeli Gallegos - Last Filed: 07/10/17 14:07>





History of Present Illness





- History of Present Illness


History of Present Illness: 


Edi Cordova is 52M w/ hx of hemorrhoids, chronic pain, and shoulder 

dysfunction who presents to the ER due to near syncope. Pt states for the past 

few weeks to a month is has been increasingly SOB, especially on exertion. Pt 

states that he has also been increasing lethargic an weak. Pt noted that prior 

to coming to the ER he suddenly felt lightheaded upon change in position. After 

ER w/u he was found to have an new and was found to have sigmoid and rectal 

wall thickening. Pt denies any recent abd pain, but wife at bedside states that 

he has been c/o of indigestion. He admits to some discomfort in LLQ, but denies 

any pain. He is chronically constipated 2/2 opiate use. According to him, he 

normally has 4-5 BM weekly. He does have a hx of hemorrhoids which required 

surgery (possible banding) 5 years ago. Pt states that he occasionally has 

BRBPR but associates it with his hemorrhoids and his often exacerbated with 

constipation and straining. He notes that he sees minimal blood during these 

episodes, and sometimes only while wiping. He states that he has had a routine 

colonoscopy 2 years ago at [facility], which he was told was neg for any findings. 

Pts also states he a had both an EGD and colonoscopy 5 years ago for lower GI 

bleed believed to be 2/2 hemorrhoids. He believes that the only findings were 

hemorrhoids. Pt notes having at least 8 lb weight loss in the last 1 month. Pt 

has lost almost 32 lb since Feb according to our records. 





PMhx: right shoulder injury, chronic pain, thoracic avulsion fractures


PSHx: rotator cuff repair and subsequent repair


Allergies: amoxicillin, Augmentin, prednisone


Social hx: , denies Etoh, sig smoking hx 1-2 packs daily for 

15+ years, still smoking < 1 pack daily, Denies illicit drug use


Family hx: reviewed family hx with pt and is not contributing 





ROS: 12- ROS was conducted and other than what was previously stated above in 

the HPI, it is otherwise neg.





Endoscopy hx: (need records from [facility], faxed release)


2 years ago: possibly at [facility]; colonoscopy screening: denies any pathology ie 

polyps or masses


5 years ago: possibly  at [facility]; Colon and EGD for GI bleed: as per pt, only 

hemorrhoids found





Past Patient History





- Infectious Disease


Hx of Infectious Diseases: None





- Tetanus Immunizations


Tetanus Immunization: Unknown





- Past Medical History & Family History


Past Medical History?: Yes





- Past Social History


Smoking Status: Current Some Days Smoker





- CARDIAC


Hx Cardiac Disorders: No





- PULMONARY


Hx Respiratory Disorders: No


Hx Bronchitis: Yes





- NEUROLOGICAL


Hx Neurological Disorder: No





- HEENT


Hx HEENT Problems: No





- RENAL


Hx Chronic Kidney Disease: No





- ENDOCRINE/METABOLIC


Hx Endocrine Disorders: No





- HEMATOLOGICAL/ONCOLOGICAL


Hx Blood Disorders: No





- INTEGUMENTARY


Hx Dermatological Problems: No





- MUSCULOSKELETAL/RHEUMATOLOGICAL


Hx Falls: No





- GASTROINTESTINAL


Hx Gastrointestinal Disorders: No





- GENITOURINARY/GYNECOLOGICAL


Hx Genitourinary Disorders: No





- PSYCHIATRIC


Hx Psychophysiologic Disorder: No


Hx Depression: No


Hx Emotional Abuse: No


Hx Physical Abuse: No





- SURGICAL HISTORY


Hx Cardiac Catheterization: Yes


Other/Comment: hemorrhoid surgery, 5 avusion fx, right rotator cuff surgery





- ANESTHESIA


Hx Anesthesia: Yes


Hx Anesthesia Reactions: No





Meds


Allergies/Adverse Reactions: 


 Allergies











Allergy/AdvReac Type Severity Reaction Status Date / Time


 


amoxicillin trihydrate Allergy  ANAPHYLAXIS Verified 07/09/17 15:10





[From Augmentin]     


 


potassium clavulanate Allergy  ANAPHYLAXIS Verified 07/09/17 15:10





[From Augmentin]     


 


prednimustine Allergy  RASH Verified 07/09/17 18:26


 


prednisolone Allergy  RASH Verified 07/09/17 18:26


 


prednisolone acetate, Allergy  RASH Verified 07/09/17 18:26





micronized     


 


prednisone Allergy  RASH Verified 07/09/17 18:26


 


prednylidene Allergy  RASH Verified 07/09/17 18:26


 


prednison Allergy  RASH Uncoded 07/09/17 18:26














- Medications


Medications: 


 Current Medications





Alprazolam (Xanax)  1 mg PO DAILY [facility]


Heparin Sodium/Dextrose (Heparin 25,000 Units/250ml In D5w)  25,000 units in 

250 mls @ 17.023 mls/hr IV .[id number] PRN; Protocol; 18 UNITS/KG/HR


   PRN Reason: ADJUST RATE PER PROTOCOL


   Last Titration: 07/10/17 03:59 Dose:  14.06 units/kg/hr, 13.3 mls/hr


Metronidazole (Flagyl)  250 mg in 50 mls @ 100 mls/hr IVPB Q8 [facility]


   PRN Reason: Protocol


   Stop: 07/15/17 14:01


Levofloxacin (Levaquin)  500 mg PO DAILY [facility]


Oxycodone HCl (Oxycodone Immediate Release Tab)  15 mg PO Q6 PRN


   PRN Reason: Pain, severe (8-10)


   Last Admin: 07/10/17 05:12 Dose:  15 mg


Pantoprazole Sodium (Protonix Ec Tab)  20 mg PO 0600,1600 [facility]











Physical Exam





- Constitutional


Appears: Well, Non-toxic, No Acute Distress





- Head Exam


Head Exam: ATRAUMATIC, NORMOCEPHALIC





- Eye Exam


Eye Exam: EOMI, Normal appearance





- ENT Exam


ENT Exam: Mucous Membranes Moist, Normal Exam





- Neck Exam


Neck exam: Positive for: Normal Inspection





- Respiratory Exam


Respiratory Exam: Clear to Auscultation Bilateral, NORMAL BREATHING PATTERN





- Cardiovascular Exam


Cardiovascular Exam: REGULAR RHYTHM.  absent: JVD





- GI/Abdominal Exam


GI & Abdominal Exam: Normal Bowel Sounds, Soft.  absent: Guarding, Organomegaly

, Pulsatile Mass, Rebound, Rigid





- Rectal Exam


Additional comments: 


no stool in rectal vault, felt hemorrhoid in the 8 and 6 0clock positions, old 

thrombosed skin tags noted around the anus








- Extremities Exam


Extremities exam: Positive for: normal inspection.  Negative for: calf 

tenderness, joint swelling





- Neurological Exam


Neurological exam: Alert, Oriented x3





- Psychiatric Exam


Psychiatric exam: Normal Affect, Normal Mood





- Skin


Skin Exam: Dry, Intact, Normal Color, Warm





Results





- Vital Signs


Recent Vital Signs: 


 Last Vital Signs











Temp  98.5 F   07/10/17 06:22


 


Pulse  70   07/10/17 06:22


 


Resp  20   07/10/17 06:22


 


BP  107/61   07/10/17 06:22


 


Pulse Ox  99   07/10/17 00:01














- Labs


Result Diagrams: 


 07/10/17 05:30





 07/10/17 05:30


Labs: 


 Laboratory Results - last 24 hr











  07/10/17 07/10/17 07/10/17





  02:15 05:30 05:30


 


WBC   5.7 


 


RBC   4.12 


 


Hgb   11.8 L 


 


Hct   34.9 L 


 


MCV   84.7 


 


MCH   28.6 


 


MCHC   33.8 


 


RDW   13.7 


 


Plt Count   147 


 


MPV   9.2 


 


Gran %   59.0 


 


Lymph % (Auto)   30.9 


 


Mono % (Auto)   8.1 H 


 


Eos % (Auto)   1.8 


 


Baso % (Auto)   0.2 


 


Gran #   3.36 


 


Lymph #   1.8 


 


Mono #   0.5 


 


Eos #   0.1 


 


Baso #   0.01 


 


APTT  136.8 H*  


 


Sodium    141


 


Potassium    4.3


 


Chloride    108 H


 


Carbon Dioxide    25


 


Anion Gap    12


 


BUN    12


 


Creatinine    0.9


 


Est GFR ( Amer)    > 60


 


Est GFR (Non-Af Amer)    > 60


 


Random Glucose    97


 


Calcium    8.8


 


Total Bilirubin    0.4


 


AST    16


 


ALT    23


 


Alkaline Phosphatase    44


 


Total Protein    6.0


 


Albumin    3.6


 


Globulin    2.4


 


Albumin/Globulin Ratio    1.5














- Imaging and Cardiology


  ** CT scan - abdomen


Status: Report reviewed by me (+ PE and wall thickening of the sigmoid and 

rectum)





Assessment & Plan





- Assessment and Plan (Free Text)


Assessment: 





Edi Cordova is a 52M w/ hx of chronic pain and hemorroids who presents to 

the ED with new PE. Pt was found to have incidental finding of sigmoid and 

rectal wall thickening without any symptoms. He has had previous colonoscopies 

and EGD in the last 2 and 5 years year. According to pt, other than the 

hemorrhoids there were no acute findings. Pt has also had a significant 

unintentional weight loss in the last year. 





Colitis of the sigmoid and rectum 


Dx: IBD, malignancy, Diverticulitis, infectious (low on diff. due to no other 

findings), ulcer


-can discontinue abx, as pt is asymptomatic and afebrile since admission


-can advance diet if tolerated 


-followup as outpt for colonoscopy 6-8 weeks post discharge for colonoscopy


-will need to get records from PCP or other GI to confirm colonoscopy and EGD 

findings 





Indigestion


DDx: GERD, r/o malignancy, dyspepsia


-continue PPI 


-can do an EGD during followup colonoscopy in 6-8 weeks


-avoid NSAIDs 





Constipation 


-likley 2/2 chronic opoid use


-pt states that he is actively weaning off of is percocet 


-can use miraalax daily, with or without senna or colace 





Hemorrhoids


-avoid constipation


-recommend not straining or sitting on toilet basin for extended periods 


- can take stool softners OTC.





D/W Dr. Fontenot





   





<Eugene Fontenot - Last Filed: 07/10/17 14:21>





Meds





- Medications


Medications: 


 Current Medications





Alprazolam (Xanax)  1 mg PO DAILY [facility]


   Last Admin: 07/10/17 10:17 Dose:  1 mg


Heparin Sodium/Dextrose (Heparin 25,000 Units/250ml In D5w)  25,000 units in 

250 mls @ 17.023 mls/hr IV .[id number] PRN; Protocol; 18 UNITS/KG/HR


   PRN Reason: ADJUST RATE PER PROTOCOL


   Last Admin: 07/10/17 11:41 Dose:  14.06 units/kg/hr, 13.297 mls/hr


Oxycodone HCl (Oxycodone Immediate Release Tab)  15 mg PO Q6 PRN


   PRN Reason: Pain, severe (8-10)


Pantoprazole Sodium (Protonix Ec Tab)  20 mg PO 0600,1600 [facility]











Results





- Vital Signs


Recent Vital Signs: 


 Last Vital Signs











Temp  98.5 F   07/10/17 06:22


 


Pulse  70   07/10/17 06:22


 


Resp  20   07/10/17 06:22


 


BP  107/61   07/10/17 06:22


 


Pulse Ox  99   07/10/17 00:01














- Labs


Result Diagrams: 


 07/10/17 05:30





 07/10/17 05:30


Labs: 


 Laboratory Results - last 24 hr











  07/10/17 07/10/17 07/10/17





  02:15 05:30 05:30


 


WBC   5.7 


 


RBC   4.12 


 


Hgb   11.8 L 


 


Hct   34.9 L 


 


MCV   84.7 


 


MCH   28.6 


 


MCHC   33.8 


 


RDW   13.7 


 


Plt Count   147 


 


MPV   9.2 


 


Gran %   59.0 


 


Lymph % (Auto)   30.9 


 


Mono % (Auto)   8.1 H 


 


Eos % (Auto)   1.8 


 


Baso % (Auto)   0.2 


 


Gran #   3.36 


 


Lymph #   1.8 


 


Mono #   0.5 


 


Eos #   0.1 


 


Baso #   0.01 


 


APTT  136.8 H*  


 


Sodium    141


 


Potassium    4.3


 


Chloride    108 H


 


Carbon Dioxide    25


 


Anion Gap    12


 


BUN    12


 


Creatinine    0.9


 


Est GFR ( Amer)    > 60


 


Est GFR (Non-Af Amer)    > 60


 


Random Glucose    97


 


Calcium    8.8


 


Total Bilirubin    0.4


 


AST    16


 


ALT    23


 


Alkaline Phosphatase    44


 


Total Protein    6.0


 


Albumin    3.6


 


Globulin    2.4


 


Albumin/Globulin Ratio    1.5














  07/10/17





  10:30


 


WBC 


 


RBC 


 


Hgb 


 


Hct 


 


MCV 


 


MCH 


 


MCHC 


 


RDW 


 


Plt Count 


 


MPV 


 


Gran % 


 


Lymph % (Auto) 


 


Mono % (Auto) 


 


Eos % (Auto) 


 


Baso % (Auto) 


 


Gran # 


 


Lymph # 


 


Mono # 


 


Eos # 


 


Baso # 


 


APTT  51.4 H


 


Sodium 


 


Potassium 


 


Chloride 


 


Carbon Dioxide 


 


Anion Gap 


 


BUN 


 


Creatinine 


 


Est GFR ( Amer) 


 


Est GFR (Non-Af Amer) 


 


Random Glucose 


 


Calcium 


 


Total Bilirubin 


 


AST 


 


ALT 


 


Alkaline Phosphatase 


 


Total Protein 


 


Albumin 


 


Globulin 


 


Albumin/Globulin Ratio 














Attending/Attestation





- Attestation


I have personally seen and examined this patient.: Yes


I have fully participated in the care of the patient.: Yes


I have reviewed all pertinent clinical information: Yes


Notes (Text): 





07/10/17 14:14


I have seen and examined patient with GI fellow.  Agree with above 

documentation with the following additions.  In brief, this is a 52 year old 

male with history of chronic muscle and joint pain, who presents to hospital 

with complaint of progressive fatigue and dyspnea for the past three weeks.  

Workup during current hospitalization revealed acute pulmonary embolism.  GI 

called for evaluation of bowel wall thickening present on CT imaging.  Patient 

admits to intermittent "indigestion" but denies abdominal pain, nausea, vomiting

, diarrhea, fever/chills, or change in bowel habits.  He does report 

significant weight loss of nearly 35 pounds over past 5 months.  He claims to 

have had a colonoscopy 2-3 years ago at [facility] which was normal as per patient.





Fatigue, dyspnea on exertion - acute pulmonary embolism


Unexplained weight loss


Colitis - CT imaging reviewed by me showing minimal non-specific sigmoid/rectal 

wall thickening without associated waqas-colonic disease





- Diet as tolerated


- Continue with anti-coagulation treatment for acute pulmonary embolism


- No clinical indication for antibiotic therapy in this situation given absence 

of GI symptoms


- Hypercoaguable workup sent by medical team, follow up results


- Obtain prior endoscopic reports from [facility]


- Given ongoing unexplained weight loss in patient with heavy smoking history 

and new onset PE, malignancy must be ruled out.  Would suggest elective 

outpatient endoscopic evaluation following resolution of acute issues.  Will 

have to coordinate with PMD since patient will require extended therapy with 

anticoagulation.


- Bowel regimen to prevent constipation

## 2017-07-10 NOTE — CP.PCM.PN
<DiazflorAristeoanna - Last Filed: 07/10/17 17:53>





Subjective





- Date & Time of Evaluation


Date of Evaluation: 07/10/17


Time of Evaluation: 07:15





- Subjective


Subjective: 


Patient has been S/E.  Patient denies any headache, Lightheadedness, Dizziness.

  Patient complains of fatigue and SOB which he states has not improved since 

yesterday.  Patient states that he still has some pain during inspiration. He 

also complains of left shoulder pain.  This morning HR was found to be in 40s. 

Patient was asymptomatic. Stat EKG was ordered which showed Sinus Yaya.  

Patient also states that he feels depressed which he thinks is a major 

contribution to his weight loss.   








Objective





- Vital Signs/Intake and Output


Vital Signs (last 24 hours): 


 











Temp Pulse Resp BP Pulse Ox


 


 98.5 F   48 L  20   107/61   99 


 


 07/10/17 06:22  07/10/17 14:00  07/10/17 06:22  07/10/17 06:22  07/10/17 00:01








Intake and Output: 


 











 07/10/17 07/10/17





 06:59 18:59


 


Intake Total 290 100


 


Balance 290 100














- Medications


Medications: 


 Current Medications





Alprazolam (Xanax)  1 mg PO DAILY [facility]


   Last Admin: 07/10/17 10:17 Dose:  1 mg


Heparin Sodium/Dextrose (Heparin 25,000 Units/250ml In D5w)  25,000 units in 

250 mls @ 17.023 mls/hr IV .[id number] PRN; Protocol; 18 UNITS/KG/HR


   PRN Reason: ADJUST RATE PER PROTOCOL


   Last Admin: 07/10/17 11:41 Dose:  14.06 units/kg/hr, 13.297 mls/hr


Oxycodone HCl (Oxycodone Immediate Release Tab)  15 mg PO Q6 PRN


   PRN Reason: Pain, severe (8-10)


Pantoprazole Sodium (Protonix Ec Tab)  20 mg PO 0600,1600 [facility]











- Labs


Labs: 


 





 07/10/17 05:30 





 07/10/17 05:30 





 











PT  10.9 Seconds (9.9-11.8)   07/09/17  15:38    


 


INR  1.01  (0.93-1.08)   07/09/17  15:38    


 


APTT  51.4 Seconds (23.7-30.8)  H  07/10/17  10:30    














- Constitutional


Appears: Non-toxic, No Acute Distress





- Head Exam


Head Exam: ATRAUMATIC, NORMOCEPHALIC





- Neck Exam


Neck Exam: absent: Lymphadenopathy





- Respiratory Exam


Respiratory Exam: Clear to Ausculation Bilateral.  absent: Rales, Rhonchi, 

Stridor





- Cardiovascular Exam


Cardiovascular Exam: Bradycardia, +S1, +S2.  absent: JVD, Murmur





- GI/Abdominal Exam


GI & Abdominal Exam: Soft, Normal Bowel Sounds.  absent: Tenderness





- Skin


Skin Exam: Intact





Assessment and Plan





- Assessment and Plan (Free Text)


Assessment: 


Patient has been S/E.  Patient denies any headache, Lightheadedness, Dizziness.

  Patient complains of fatigue and SOB which he states has not improved since 

yesterday.  Patient states that he still has some pain during inspiration. He 

also complains of left shoulder pain.  This morning HR was found to be in 40s. 

Patient was asymptomatic. Stat EKG was ordered which showed Sinus Yaya.    





Plan: 


1.) SOB 2/2 pulmonary embolism


   a.) heparin drip


   b.) pulm on consult


   c.) f/u with PMD


2.) CP likely 2/2 #1 above, VS unknown etiology


   a.) Cardio on consult


3. Asymptomatic Sinus Yaya


   a.) Hold Opiod Medications if HR below 50


4.) History of TB


   a.) Was on Isoniazied for 6 months. 


   b.) Pulm consulted.  


5.) Unprovoked weight loss possibly 2/2 malignancy VS anxiety VS colitis VS 

unknown etiology


   a.) uncalcified pulmonary nodules identified on CT   


   b.) colon inflammation on CT


   c.) patient has a hx of anxiety


   d.) heme onc on consult


   e.) gi on consult


   f.) pulm on consult


6.) Hx/O shoulder pain


   a.) pt on home Percocet (hold if HR below 50)


   b.) CT scan of left shoulder negative.  


7.) Rule out Depression


   a.)Psych consult


8.) Hx/O Tobacco abuse


   a.) advise patient on tobacco cessation


9.) GI PPHXS


   a.) Protonix


10.) DVT PPHXS


   a.) Heparin drip. Will bridge to Coumadin tomorrow.  


























<Margarita Hernandez B - Last Filed: 07/11/17 14:55>





Objective





- Vital Signs/Intake and Output


Vital Signs (last 24 hours): 


 











Temp Pulse Resp BP Pulse Ox


 


 98.5 F   57 L  20   100/66   100 


 


 07/11/17 11:47  07/11/17 11:47  07/11/17 11:47  07/11/17 11:47  07/11/17 05:50








Intake and Output: 


 











 07/11/17 07/11/17





 06:59 18:59


 


Intake Total 250 


 


Output Total 0 


 


Balance 250 














- Labs


Labs: 


 





 07/11/17 09:00 





 07/11/17 09:00 





 











PT  10.9 Seconds (9.9-11.8)   07/09/17  15:38    


 


INR  1.01  (0.93-1.08)   07/09/17  15:38    


 


APTT  52.9 Seconds (23.7-30.8)  H  07/11/17  09:00    














Attending/Attestation





- Attestation


I have personally seen and examined this patient.: Yes


I have fully participated in the care of the patient.: Yes


I have reviewed all pertinent clinical information, including history, physical 

exam and plan: Yes


Notes (Text): 


I have seen and examined patient at bedside. Agree with the above note with the 

following additions/ exceptions: Briefly this is 52 year old male with history 

of DVT of LE x2 but has never been on anticoagulation, sinus bradycardia, 

history of TB, unintentional weight loss, shoulder pain, tobacco use who was 

admitted for evaluation of shortness of breath secondary to acute PE. Continue 

heparin drip. Plan to start novel anticoagulants tomorrow. Awaiting hematology 

consult. He is requesting for psychology consult. Upon discharge patient will 

follow up with Dr Yancey.


Dr Margarita Hernandez

## 2017-07-11 VITALS
DIASTOLIC BLOOD PRESSURE: 66 MMHG | SYSTOLIC BLOOD PRESSURE: 100 MMHG | HEART RATE: 57 BPM | RESPIRATION RATE: 20 BRPM | TEMPERATURE: 98.5 F

## 2017-07-11 VITALS — OXYGEN SATURATION: 100 %

## 2017-07-11 LAB
ALBUMIN SERPL-MCNC: 4.1 G/DL (ref 3–4.8)
ALBUMIN/GLOB SERPL: 1.6 {RATIO} (ref 1.1–1.8)
ALT SERPL-CCNC: 25 U/L (ref 7–56)
AST SERPL-CCNC: 19 U/L (ref 15–59)
BUN SERPL-MCNC: 12 MG/DL (ref 7–21)
CALCIUM SERPL-MCNC: 9.4 MG/DL (ref 8.4–10.5)
ERYTHROCYTE [DISTWIDTH] IN BLOOD BY AUTOMATED COUNT: 13.3 % (ref 11.5–14.5)
GFR NON-AFRICAN AMERICAN: > 60
HGB BLD-MCNC: 12.5 GM/DL (ref 14–18)
MCH RBC QN AUTO: 28.6 PG (ref 25–35)
MCHC RBC AUTO-ENTMCNC: 34.2 G/DL (ref 31–37)
MCV RBC AUTO: 83.5 FL (ref 80–105)
PLATELET # BLD: 160 10^3/UL (ref 120–450)
PMV BLD AUTO: 9.2 FL (ref 7–11)
RBC # BLD AUTO: 4.37 10^6/UL (ref 3.5–6.1)
WBC # BLD AUTO: 5.5 10^3/UL (ref 4.5–11)

## 2017-07-11 RX ADMIN — PANTOPRAZOLE SODIUM SCH: 20 TABLET, DELAYED RELEASE ORAL at 05:14

## 2017-07-11 RX ADMIN — HEPARIN SODIUM AND DEXTROSE PRN MLS/HR: 5000; 5 INJECTION INTRAVENOUS at 05:03

## 2017-07-11 RX ADMIN — OXYCODONE HYDROCHLORIDE PRN MG: 15 TABLET ORAL at 11:15

## 2017-07-11 RX ADMIN — OXYCODONE HYDROCHLORIDE PRN MG: 15 TABLET ORAL at 05:09

## 2017-07-11 NOTE — CP.PCM.PN
<Nayeli Gallegos - Last Filed: 07/11/17 12:11>





Subjective





- Date & Time of Evaluation


Date of Evaluation: 07/11/17


Time of Evaluation: 10:00





- Subjective


Subjective: 


GI Follow-up





Pt seen and examined bedside


Wife at bedside


Uneventful overnight


Denies any Abd pain]


tolerating diet 


No diarrhea, nausea or vomiting


No other complaints other than pain





12-point ROS conducted by myself, other than what is previously stated above, 

it is neg 





Objective





- Vital Signs/Intake and Output


Vital Signs (last 24 hours): 


 











Temp Pulse Resp BP Pulse Ox


 


 98.2 F   42 L  19   115/65   100 


 


 07/11/17 05:50  07/11/17 05:50  07/11/17 05:50  07/11/17 05:50  07/11/17 05:50








Intake and Output: 


 











 07/11/17 07/11/17





 06:59 18:59


 


Intake Total 250 


 


Output Total 0 


 


Balance 250 














- Medications


Medications: 


 Current Medications





Alprazolam (Xanax)  1 mg PO DAILY [facility]


   Last Admin: 07/11/17 09:21 Dose:  1 mg


Heparin Sodium/Dextrose (Heparin 25,000 Units/250ml In D5w)  25,000 units in 

250 mls @ 17.023 mls/hr IV .[id number] PRN; Protocol; 18 UNITS/KG/HR


   PRN Reason: ADJUST RATE PER PROTOCOL


   Last Admin: 07/11/17 05:03 Dose:  14.06 units/kg/hr, 13.297 mls/hr


Oxycodone HCl (Oxycodone Immediate Release Tab)  15 mg PO Q6 PRN


   PRN Reason: Pain, severe (8-10)


   Last Admin: 07/11/17 05:09 Dose:  15 mg


Pantoprazole Sodium (Protonix Ec Tab)  20 mg PO 0600,1600 [facility]


   Last Admin: 07/11/17 05:14 Dose:  Not Given











- Labs


Labs: 


 





 07/11/17 09:00 





 07/11/17 09:00 





 











PT  10.9 Seconds (9.9-11.8)   07/09/17  15:38    


 


INR  1.01  (0.93-1.08)   07/09/17  15:38    


 


APTT  52.9 Seconds (23.7-30.8)  H  07/11/17  09:00    














- Constitutional


Appears: Well, No Acute Distress





- Head Exam


Head Exam: ATRAUMATIC, NORMOCEPHALIC





- Eye Exam


Eye Exam: Normal appearance





- ENT Exam


ENT Exam: Mucous Membranes Moist, Normal Exam





- Neck Exam


Neck Exam: Full ROM, Normal Inspection





- Respiratory Exam


Respiratory Exam: Clear to Ausculation Bilateral, NORMAL BREATHING PATTERN





- Cardiovascular Exam


Cardiovascular Exam: REGULAR RHYTHM





- GI/Abdominal Exam


GI & Abdominal Exam: Soft, Normal Bowel Sounds.  absent: Distended, Firm, 

Guarding, Rigid, Tenderness, Organomegaly





- Back Exam


Back Exam: NORMAL INSPECTION





- Neurological Exam


Neurological Exam: Alert, Awake, Oriented x3





- Skin


Skin Exam: Dry, Intact, Normal Color, Warm





Assessment and Plan





- Assessment and Plan (Free Text)


Assessment: 





Edi Cordova is a 52M w/ hx of chronic pain and hemorroids who presents to 

the ED with new PE. Pt was found to have incidental finding of sigmoid and 

rectal wall thickening without any symptoms. He has had previous colonoscopies 

and EGD in the last 2 and 5 years year. According to pt, other than the 

hemorrhoids there were no acute findings. Pt has also had a significant 

unintentional weight loss in the last year. 





Colitis of the sigmoid and rectum 


Dx: IBD, malignancy, Diverticulitis, infectious (low on diff. due to no other 

findings), ulcer


-Pt is asymptomatic and afebrile since admission


-followup as outpt for colonoscopy with his regular PI hernán 6-8 weeks post 

discharge for colonoscopy, pt advised to follow-up with us if unable to get in 

contact with prior GI physician


-f/u with PCP





Indigestion


DDx: GERD, r/o malignancy, dyspepsia


-EGD as outpt





Constipation 


-pancho 2/2 chronic opoid use


-pt states that he is actively weaning off of is percocet 


-can use miraalax daily, with or without senna or colace 





Hemorrhoids


-avoid constipation


-recommend not straining or sitting on toilet basin for extended periods 


- can take stool softners OTC.





Will D/W Dr. Dotson





<Rome Dotson - Last Filed: 07/11/17 12:34>





Objective





- Vital Signs/Intake and Output


Vital Signs (last 24 hours): 


 











Temp Pulse Resp BP Pulse Ox


 


 98.5 F   57 L  20   100/66   100 


 


 07/11/17 11:47  07/11/17 11:47  07/11/17 11:47  07/11/17 11:47  07/11/17 05:50








Intake and Output: 


 











 07/11/17 07/11/17





 06:59 18:59


 


Intake Total 250 


 


Output Total 0 


 


Balance 250 














- Medications


Medications: 


 Current Medications





Alprazolam (Xanax)  1 mg PO DAILY [facility]


   Last Admin: 07/11/17 09:21 Dose:  1 mg


Heparin Sodium/Dextrose (Heparin 25,000 Units/250ml In D5w)  25,000 units in 

250 mls @ 17.023 mls/hr IV .[id number] PRN; Protocol; 18 UNITS/KG/HR


   PRN Reason: ADJUST RATE PER PROTOCOL


   Last Admin: 07/11/17 05:03 Dose:  14.06 units/kg/hr, 13.297 mls/hr


Oxycodone HCl (Oxycodone Immediate Release Tab)  15 mg PO Q6 PRN


   PRN Reason: Pain, severe (8-10)


   Last Admin: 07/11/17 11:15 Dose:  15 mg


Pantoprazole Sodium (Protonix Ec Tab)  20 mg PO 0600,1600 [facility]


   Last Admin: 07/11/17 05:14 Dose:  Not Given











- Labs


Labs: 


 





 07/11/17 09:00 





 07/11/17 09:00 





 











PT  10.9 Seconds (9.9-11.8)   07/09/17  15:38    


 


INR  1.01  (0.93-1.08)   07/09/17  15:38    


 


APTT  52.9 Seconds (23.7-30.8)  H  07/11/17  09:00    














Attending/Attestation





- Attestation


I have personally seen and examined this patient.: Yes


I have fully participated in the care of the patient.: Yes


I have reviewed all pertinent clinical information, including history, physical 

exam and plan: Yes


Notes (Text): 





07/11/17 12:30


52 year old male with h/o chronic MSK pain admitted with fatigue/dysphnea, 

found to have PE. Also reports significant weight loss in the past year. 


1. Unintentional weight loss


2. Abnormal CT scan of the GI tract


3. Constipation


4. Indigestion


Plan:


- Diet as tolerated


- Continue anticoagulation


- Considering unexplained weight loss in patient with heavy smoking history and 

new onset PE, malignancy must be ruled out.  Would suggest elective outpatient 

endoscopic evaluation following resolution of acute issues


- patient states that he will go see Dr. Boxer at [facility] for colonoscopy/

endoscopy after discharge


- continue bowel regimen for constipation


-continue ppi for indigestion as needed


-will sign off at this time

## 2017-07-11 NOTE — CP.PCM.CON
History of Present Illness





- History of Present Illness


History of Present Illness: 





Shortly patient is 52 years old  male, not known previous psychiatric 

history, patient was admitted on the medical side for evaluation of dizziness, 

near syncopal episode. Patient has weight loss, medical team called for 

psychiatric evaluation for possible depressive symptoms, moreover patient was 

on Xanax in the community.





Patient was seen and examined today discussed with the patient wife, patient 

gave permission to talk to her.





Patient seems to be surprised by the fact that this writer is seeing him, 

patient reported that he has no history of being depressed, denied history or 

suicidal attempts. patient reported this March patient was admitted to [facility] psychiatric inpatient unit because he had delirium stage, 

was aggressive, from the patient reported he had steroid in treatment for his 

are, patient said that he stayed in to the psychiatric inpatient unit for 16 

hours, then was discharged.





Patient reported that he has a lot of medical issues which prevents patient 

from working, patient said that family has financial problems right now and he 

feels depressed about that, patient also reported that he feels depressed 

because of his medical issues, patient denied feeling hopeless, denied thoughts 

of harming himself or others. he shouldn't has future oriented plans, patient 

said that he wants to go for computer classes, and to find a new job.





Patient has transient exciting for what patient was on Xanax, patient primary 

care physician started Lexapro, patient said that his anxiety became worse, 

discontinue the medication.





Patient denied hearing voices denied seeing things denied paranoid ideations.





Patient denied using drugs but reported smoking, counseling provided.





Patient wife confirmed the story, was appreciated, expressed no concerns about 

patient's safety.





labs reviewed





Mental status examination:


Patient presented to have good personal hygiene, with ADLs, good eye contact, 

speech was normal of rate" in quantity, well related to this writer, mood 

described "I feel depressed because of her medical issues as well as finances", 

patient's affect was constricted but reactive, mood congruent. Thought process 

was coherent and goal directed. Thought content: Patient denied visual, auditory

, tactile hallucinations, denied paranoid ideations, denied thoughts of harming 

himself or others. Insight and judgment are fair, impulses are well controlled.





Patient was offered follow up with therapist, patient expressed his interest, 

[facility] outpatient clinic'

s contact information was provided, patient also was provided with private 

therapist contact info Bessie Montalvo. 





Impression:


Rule out mood disorder due to general medical condition


Rule out adjustment disorder with depressed and anxious mood





Plan:


Continue current management


Patient is not in imminent danger to self or others


Patient expressed his interest to see therapist as outpatient, information 

about local providers given


patient adamantly denied thoughts of harming himself or others denied intent or 

plan


This writer will sign off


Thank you very much for lisinopril speak and carefully patient











Past Patient History





- Infectious Disease


Hx of Infectious Diseases: None





- Tetanus Immunizations


Tetanus Immunization: Unknown





- Past Medical History & Family History


Past Medical History?: Yes





- Past Social History


Smoking Status: Current Some Days Smoker





- CARDIAC


Hx Cardiac Disorders: No





- PULMONARY


Hx Respiratory Disorders: No


Hx Bronchitis: Yes





- NEUROLOGICAL


Hx Neurological Disorder: No





- HEENT


Hx HEENT Problems: No





- RENAL


Hx Chronic Kidney Disease: No





- ENDOCRINE/METABOLIC


Hx Endocrine Disorders: No





- HEMATOLOGICAL/ONCOLOGICAL


Hx Blood Disorders: No





- INTEGUMENTARY


Hx Dermatological Problems: No





- MUSCULOSKELETAL/RHEUMATOLOGICAL


Hx Falls: No





- GASTROINTESTINAL


Hx Gastrointestinal Disorders: No





- GENITOURINARY/GYNECOLOGICAL


Hx Genitourinary Disorders: No





- PSYCHIATRIC


Hx Psychophysiologic Disorder: No


Hx Depression: No


Hx Emotional Abuse: No


Hx Physical Abuse: No





- SURGICAL HISTORY


Hx Cardiac Catheterization: Yes


Other/Comment: hemorrhoid surgery, 5 avusion fx, right rotator cuff surgery





- ANESTHESIA


Hx Anesthesia: Yes


Hx Anesthesia Reactions: No





Meds


Home Medications: 


 Home Medication List











 Medication  Instructions  Recorded  Confirmed  Type


 


Apixaban [Eliquis] 5 mg PO BID #30 tablet 07/11/17  Rx











Allergies/Adverse Reactions: 


 Allergies











Allergy/AdvReac Type Severity Reaction Status Date / Time


 


amoxicillin trihydrate Allergy  ANAPHYLAXIS Verified 07/09/17 15:10





[From Augmentin]     


 


potassium clavulanate Allergy  ANAPHYLAXIS Verified 07/09/17 15:10





[From Augmentin]     


 


prednimustine Allergy  RASH Verified 07/09/17 18:26


 


prednisolone Allergy  RASH Verified 07/09/17 18:26


 


prednisolone acetate, Allergy  RASH Verified 07/09/17 18:26





micronized     


 


prednisone Allergy  RASH Verified 07/09/17 18:26


 


prednylidene Allergy  RASH Verified 07/09/17 18:26


 


prednison Allergy  RASH Uncoded 07/09/17 18:26














- Medications


Medications: 


 Current Medications





Alprazolam (Xanax)  1 mg PO DAILY [facility]


   Last Admin: 07/11/17 09:21 Dose:  1 mg


Oxycodone HCl (Oxycodone Immediate Release Tab)  15 mg PO Q6 PRN


   PRN Reason: Pain, severe (8-10)


   Last Admin: 07/11/17 11:15 Dose:  15 mg


Pantoprazole Sodium (Protonix Ec Tab)  20 mg PO 0600,1600 [facility]


   Last Admin: 07/11/17 05:14 Dose:  Not Given











Results





- Vital Signs


Recent Vital Signs: 


 Last Vital Signs











Temp  98.5 F   07/11/17 11:47


 


Pulse  57 L  07/11/17 11:47


 


Resp  20   07/11/17 11:47


 


BP  100/66   07/11/17 11:47


 


Pulse Ox  100   07/11/17 05:50














- Labs


Result Diagrams: 


 07/11/17 09:00





 07/11/17 09:00


Labs: 


 Laboratory Results - last 24 hr











  07/10/17 07/11/17 07/11/17





  15:37 09:00 09:00


 


WBC   5.5 


 


RBC   4.37 


 


Hgb   12.5 L 


 


Hct   36.5 L 


 


MCV   83.5 


 


MCH   28.6 


 


MCHC   34.2 


 


RDW   13.3 


 


Plt Count   160 


 


MPV   9.2 


 


APTT  54.0 H  


 


Sodium    142


 


Potassium    3.5 L


 


Chloride    107


 


Carbon Dioxide    25


 


Anion Gap    14


 


BUN    12


 


Creatinine    0.9


 


Est GFR ( Amer)    > 60


 


Est GFR (Non-Af Amer)    > 60


 


Random Glucose    116 H


 


Calcium    9.4


 


Total Bilirubin    0.6


 


AST    19


 


ALT    25


 


Alkaline Phosphatase    42


 


Total Protein    6.5


 


Albumin    4.1


 


Globulin    2.5


 


Albumin/Globulin Ratio    1.6














  07/11/17





  09:00


 


WBC 


 


RBC 


 


Hgb 


 


Hct 


 


MCV 


 


MCH 


 


MCHC 


 


RDW 


 


Plt Count 


 


MPV 


 


APTT  52.9 H


 


Sodium 


 


Potassium 


 


Chloride 


 


Carbon Dioxide 


 


Anion Gap 


 


BUN 


 


Creatinine 


 


Est GFR ( Amer) 


 


Est GFR (Non-Af Amer) 


 


Random Glucose 


 


Calcium 


 


Total Bilirubin 


 


AST 


 


ALT 


 


Alkaline Phosphatase 


 


Total Protein 


 


Albumin 


 


Globulin 


 


Albumin/Globulin Ratio

## 2017-07-11 NOTE — CP.PCM.DIS
<Anup Rey - Last Filed: 07/11/17 13:52>





Provider





- Provider


Date of Admission: 


07/09/17 19:15





Attending physician: 


Margarita Hernandez MD





Primary care physician: 


Lamberto Yancey MD





Consults: 





Gi - Dr. Po Boucher - Dr. Alvarez


Heme  Dr. Pettit


Cardio - Dr. Rodrigues


Time Spent in preparation of Discharge (in minutes): 35





Hospital Course





- Lab Results


Lab Results: 


 Most Recent Lab Values











WBC  5.5 10^3/ul (4.5-11.0)   07/11/17  09:00    


 


RBC  4.37 10^6/uL (3.5-6.1)   07/11/17  09:00    


 


Hgb  12.5 gm/dL (14.0-18.0)  L  07/11/17  09:00    


 


Hct  36.5 % (42.0-52.0)  L  07/11/17  09:00    


 


MCV  83.5 fL (80.0-105.0)   07/11/17  09:00    


 


MCH  28.6 pg (25.0-35.0)   07/11/17  09:00    


 


MCHC  34.2 g/dl (31.0-37.0)   07/11/17  09:00    


 


RDW  13.3 % (11.5-14.5)   07/11/17  09:00    


 


Plt Count  160 10^3/uL (120.0-450.0)   07/11/17  09:00    


 


MPV  9.2 fl (7.0-11.0)   07/11/17  09:00    


 


Gran %  59.0 % (50.0-68.0)   07/10/17  05:30    


 


Lymph % (Auto)  30.9 % (22.0-35.0)   07/10/17  05:30    


 


Mono % (Auto)  8.1 % (1.0-6.0)  H  07/10/17  05:30    


 


Eos % (Auto)  1.8 % (1.5-5.0)   07/10/17  05:30    


 


Baso % (Auto)  0.2 % (0.0-3.0)   07/10/17  05:30    


 


Gran #  3.36  (1.4-6.5)   07/10/17  05:30    


 


Lymph #  1.8  (1.2-3.4)   07/10/17  05:30    


 


Mono #  0.5  (0.1-0.6)   07/10/17  05:30    


 


Eos #  0.1  (0.0-0.7)   07/10/17  05:30    


 


Baso #  0.01 K/mm3 (0.0-2.0)   07/10/17  05:30    


 


PT  10.9 Seconds (9.9-11.8)   07/09/17  15:38    


 


INR  1.01  (0.93-1.08)   07/09/17  15:38    


 


APTT  52.9 Seconds (23.7-30.8)  H  07/11/17  09:00    


 


Sodium  142 mmol/L (132-148)   07/11/17  09:00    


 


Potassium  3.5 mmol/L (3.6-5.0)  L  07/11/17  09:00    


 


Chloride  107 mmol/L ()   07/11/17  09:00    


 


Carbon Dioxide  25 mmol/L (21-33)   07/11/17  09:00    


 


Anion Gap  14  (10-20)   07/11/17  09:00    


 


BUN  12 mg/dL (7-21)   07/11/17  09:00    


 


Creatinine  0.9 mg/dL (0.5-1.4)   07/11/17  09:00    


 


Est GFR ( Amer)  > 60   07/11/17  09:00    


 


Est GFR (Non-Af Amer)  > 60   07/11/17  09:00    


 


Random Glucose  116 mg/dL ()  H  07/11/17  09:00    


 


Calcium  9.4 mg/dL (8.4-10.5)   07/11/17  09:00    


 


Magnesium  2.1 mg/dL (1.7-2.2)   07/09/17  15:38    


 


Total Bilirubin  0.6 mg/dL (0.2-1.3)   07/11/17  09:00    


 


Direct Bilirubin  0.3 mg/dL (0.0-0.4)   07/09/17  15:38    


 


AST  19 U/L (15-59)   07/11/17  09:00    


 


ALT  25 U/L (7-56)   07/11/17  09:00    


 


Alkaline Phosphatase  42 U/L ()   07/11/17  09:00    


 


Lactate Dehydrogenase  318 U/L (333-699)  L  07/09/17  15:38    


 


Total Creatine Kinase  85 U/L ()   07/09/17  15:38    


 


Troponin I  < 0.01 ng/mL  07/09/17  15:38    


 


NT-Pro-B Natriuret Pep  89.4 pg/mL (0-450)   07/09/17  15:38    


 


Total Protein  6.5 g/dL (5.8-8.3)   07/11/17  09:00    


 


Albumin  4.1 g/dL (3.0-4.8)   07/11/17  09:00    


 


Globulin  2.5 gm/dL  07/11/17  09:00    


 


Albumin/Globulin Ratio  1.6  (1.1-1.8)   07/11/17  09:00    


 


Lipase  49 U/L ()   07/09/17  15:38    


 


TSH 3rd Generation  0.83 mIU/mL (0.46-4.68)   07/09/17  15:38    


 


Urine Color  Yellow  (YELLOW)   07/09/17  16:00    


 


Urine Appearance  Clear  (CLEAR)   07/09/17  16:00    


 


Urine pH  8.5  (4.7-8.0)   07/09/17  16:00    


 


Ur Specific Gravity  1.010  (1.005-1.035)   07/09/17  16:00    


 


Urine Protein  Negative mg/dL (<30 mg/dL)   07/09/17  16:00    


 


Urine Glucose (UA)  Negative mg/dL (NEGATIVE)   07/09/17  16:00    


 


Urine Ketones  Negative mg/dL (NEGATIVE)   07/09/17  16:00    


 


Urine Blood  Negative  (NEGATIVE)   07/09/17  16:00    


 


Urine Nitrate  Negative  (NEGATIVE)   07/09/17  16:00    


 


Urine Bilirubin  Negative  (NEGATIVE)   07/09/17  16:00    


 


Urine Urobilinogen  0.2 E.U./dL (<1 E.U./dL)   07/09/17  16:00    


 


Ur Leukocyte Esterase  Negative Iraida/uL (NEGATIVE)   07/09/17  16:00    














- Hospital Course


Hospital Course: 


This patient is a 55 year old male with a PMHx of a 15pack/ year tobacco use 

and Right sided shoulder pain (takes Percocet 15) who presented to the ED with 

complaints of SOB x 2 months which has gotten worse 2-3 days prior to ED visit, 

and shoulder pain post a pre-syncopal episode.  Patient states that he almost 

passed out in the shower and had to stabilize himself by holding on to the 

railing which caused him to hurt his shoulder.  Left Shoulder X-ray showed mild 

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  CT of Chest/Abd/Pelvis showed evidence of PE, colonic 

diverticula and mild wall thickening of the sigmoid colon and rectum.  GI, Pulm

, and Heme were consulted and patient began treatment for PE.  During stay, 

patient had intermittent episodes of bradycardia in the 40s. Cardio was 

consulted.  Pain medications were held for HR < 60. During stay patient also 

mentioned 80lbs weight loss within the past year and hx of TB. Further 

investigation revealed patient was treated for TB with Isoniazid for 6 months. 

  Patient was placed on Heparin Drip for treatment of PE, and will start 

Eliquis 5mg BID for his outpatient anticoagulation regiment.  Patient is to 

follow up with his Primary physician (Dr. Yancey), GI, and Heme in the 

outpatient setting.  Medications and Plan were reviewed with patient and he is 

agreeable to it.





Patient seen, reviewed and discussed with Attending. 





Anup Rey PGY-1


Pager: [phone]    








- Date & Time of H&P


Date of H&P: 07/11/17


Time of H&P: 08:00





Discharge Exam





- Head Exam


Head Exam: ATRAUMATIC, NORMOCEPHALIC





- Eye Exam


Eye Exam: EOMI, Normal appearance





- Respiratory Exam


Respiratory Exam: Clear to PA & Lateral.  absent: Rales, Rhonchi, Wheezes, 

Stridor





- Cardiovascular Exam


Cardiovascular Exam: REGULAR RHYTHM, +S1, +S2.  absent: JVD





- GI/Abdominal Exam


GI & Abdominal Exam: Normal Bowel Sounds, Soft.  absent: Tenderness





- Extremities Exam


Additional comments: 





no pedal edema








- Neurological Exam


Neurological exam: Alert, Oriented x3





- Psychiatric Exam


Psychiatric exam: Normal Affect, Normal Mood





Discharge Plan





- Discharge Medications


Prescriptions: 


Apixaban [Eliquis] 5 mg PO BID #30 tablet





- Follow Up Plan


Condition: FAIR


Disposition: HOME/ ROUTINE


Instructions:  Apixaban (By mouth), Pulmonary Embolism (DC), How to Stop 

Smoking (GEN), Syncope (GEN), Cigarette Smoking and Your Health (GEN), 

Dizziness (GEN)


Additional Instructions: 


Discharge instructions:  please follow up with primary physician within one 

week and follow up with Dr. Pettit within one week.  Please start new 

medication "ELIQUIS" TODAY.  If condition occurs again, go to the nearest 

emergency room.  Please do not drive and/or operate heavy machinery when taking 

pain medications.





diet:  Heart Healthy diet





Patient states he is up to date with regards to the flu and the pneumococcal 

vaccines.


Referrals: 


Lamberto Yancey MD [Primary Care Provider] - 





<Margarita Hernandez - Last Filed: 07/13/17 14:03>





Provider





- Provider


Date of Admission: 


07/09/17 19:15





Attending physician: 


Margarita Hernandez MD





Primary care physician: 


Lamberto Yancey MD








Hospital Course





- Lab Results


Lab Results: 


 Most Recent Lab Values











WBC  5.5 10^3/ul (4.5-11.0)   07/11/17  09:00    


 


RBC  4.37 10^6/uL (3.5-6.1)   07/11/17  09:00    


 


Hgb  12.5 gm/dL (14.0-18.0)  L  07/11/17  09:00    


 


Hct  36.5 % (42.0-52.0)  L  07/11/17  09:00    


 


MCV  83.5 fL (80.0-105.0)   07/11/17  09:00    


 


MCH  28.6 pg (25.0-35.0)   07/11/17  09:00    


 


MCHC  34.2 g/dl (31.0-37.0)   07/11/17  09:00    


 


RDW  13.3 % (11.5-14.5)   07/11/17  09:00    


 


Plt Count  160 10^3/uL (120.0-450.0)   07/11/17  09:00    


 


MPV  9.2 fl (7.0-11.0)   07/11/17  09:00    


 


Gran %  59.0 % (50.0-68.0)   07/10/17  05:30    


 


Lymph % (Auto)  30.9 % (22.0-35.0)   07/10/17  05:30    


 


Mono % (Auto)  8.1 % (1.0-6.0)  H  07/10/17  05:30    


 


Eos % (Auto)  1.8 % (1.5-5.0)   07/10/17  05:30    


 


Baso % (Auto)  0.2 % (0.0-3.0)   07/10/17  05:30    


 


Gran #  3.36  (1.4-6.5)   07/10/17  05:30    


 


Lymph #  1.8  (1.2-3.4)   07/10/17  05:30    


 


Mono #  0.5  (0.1-0.6)   07/10/17  05:30    


 


Eos #  0.1  (0.0-0.7)   07/10/17  05:30    


 


Baso #  0.01 K/mm3 (0.0-2.0)   07/10/17  05:30    


 


PT  10.9 Seconds (9.9-11.8)   07/09/17  15:38    


 


INR  1.01  (0.93-1.08)   07/09/17  15:38    


 


APTT  52.9 Seconds (23.7-30.8)  H  07/11/17  09:00    


 


Protein C Antigen  86 % ()   07/09/17  20:05    


 


Protein C Activity  90 % ()   07/09/17  20:05    


 


Protein S Activity  74 % ()   07/09/17  20:05    


 


Protein S Antigen  78 % ()   07/09/17  20:05    


 


Antithrombin III Activ  98 % activity ()   07/09/17  20:05    


 


von Willebrand Antigen  135 % ()   07/09/17  20:05    


 


Sodium  142 mmol/L (132-148)   07/11/17  09:00    


 


Potassium  3.5 mmol/L (3.6-5.0)  L  07/11/17  09:00    


 


Chloride  107 mmol/L ()   07/11/17  09:00    


 


Carbon Dioxide  25 mmol/L (21-33)   07/11/17  09:00    


 


Anion Gap  14  (10-20)   07/11/17  09:00    


 


BUN  12 mg/dL (7-21)   07/11/17  09:00    


 


Creatinine  0.9 mg/dL (0.5-1.4)   07/11/17  09:00    


 


Est GFR ( Amer)  > 60   07/11/17  09:00    


 


Est GFR (Non-Af Amer)  > 60   07/11/17  09:00    


 


Random Glucose  116 mg/dL ()  H  07/11/17  09:00    


 


Calcium  9.4 mg/dL (8.4-10.5)   07/11/17  09:00    


 


Magnesium  2.1 mg/dL (1.7-2.2)   07/09/17  15:38    


 


Total Bilirubin  0.6 mg/dL (0.2-1.3)   07/11/17  09:00    


 


Direct Bilirubin  0.3 mg/dL (0.0-0.4)   07/09/17  15:38    


 


AST  19 U/L (15-59)   07/11/17  09:00    


 


ALT  25 U/L (7-56)   07/11/17  09:00    


 


Alkaline Phosphatase  42 U/L ()   07/11/17  09:00    


 


Lactate Dehydrogenase  318 U/L (333-699)  L  07/09/17  15:38    


 


Total Creatine Kinase  85 U/L ()   07/09/17  15:38    


 


Troponin I  < 0.01 ng/mL  07/09/17  15:38    


 


NT-Pro-B Natriuret Pep  89.4 pg/mL (0-450)   07/09/17  15:38    


 


Total Protein  6.5 g/dL (5.8-8.3)   07/11/17  09:00    


 


Albumin  4.1 g/dL (3.0-4.8)   07/11/17  09:00    


 


Globulin  2.5 gm/dL  07/11/17  09:00    


 


Albumin/Globulin Ratio  1.6  (1.1-1.8)   07/11/17  09:00    


 


Lipase  49 U/L ()   07/09/17  15:38    


 


TSH 3rd Generation  0.83 mIU/mL (0.46-4.68)   07/09/17  15:38    


 


Urine Color  Yellow  (YELLOW)   07/09/17  16:00    


 


Urine Appearance  Clear  (CLEAR)   07/09/17  16:00    


 


Urine pH  8.5  (4.7-8.0)   07/09/17  16:00    


 


Ur Specific Gravity  1.010  (1.005-1.035)   07/09/17  16:00    


 


Urine Protein  Negative mg/dL (<30 mg/dL)   07/09/17  16:00    


 


Urine Glucose (UA)  Negative mg/dL (NEGATIVE)   07/09/17  16:00    


 


Urine Ketones  Negative mg/dL (NEGATIVE)   07/09/17  16:00    


 


Urine Blood  Negative  (NEGATIVE)   07/09/17  16:00    


 


Urine Nitrate  Negative  (NEGATIVE)   07/09/17  16:00    


 


Urine Bilirubin  Negative  (NEGATIVE)   07/09/17  16:00    


 


Urine Urobilinogen  0.2 E.U./dL (<1 E.U./dL)   07/09/17  16:00    


 


Ur Leukocyte Esterase  Negative Iraida/uL (NEGATIVE)   07/09/17  16:00    


 


Phosphatidylserine IgG  <10 U/mL (<10)   07/09/17  20:05    


 


Phosphatidylserine IgA  <20 U/mL (<20)   07/09/17  20:05    


 


Phosphatidylserine IgM  <25 U/mL (<25)   07/09/17  20:05    


 


Anti-Cardiolipin IgG Ab  <14 GPL (<=14)   07/09/17  20:05    


 


Anti-Cardiolipin IgA Ab  <11 APL (<=11)   07/09/17  20:05    


 


Anti-Cardiolipin IgM Ab  35 MPL (<=12)  H  07/09/17  20:05    














Attending/Attestation





- Attestation


I have personally seen and examined this patient.: Yes


I have fully participated in the care of the patient.: Yes


I have reviewed all pertinent clinical information, including history, physical 

exam and plan: Yes


Notes (Text): 


I have seen and examined the patient at bedside. Agree with the discharge 

summary dictated above with the following additions/ exceptions:Briefly this is 

52 year old male with history of DVT of LE x2 but has never been on 

anticoagulation, sinus bradycardia, history of TB s/p INH tx, unintentional 

weight loss, shoulder pain, tobacco use who was admitted for evaluation of 

shortness of breath secondary to acute PE. He was given IV heparin drip. 

Hematology consult was obtained and eliquis was started. Patient was offered 

meds to beds however he wanted to use his own pharmacy [facility]. All 

scripts were sent to his preferred pharmacy. Patient wanted to be discharged 

asap. Patients wife is at the bedside and understand that he needs to be 

started on eliquis today. Patient wife informed us that she will  his 

medication today. Patient will follow up with Dr Yancey and Dr Claros. We 

tried to contact PMD however his phone was not working. Patients wife was 

informed about that. 


Dr Margarita Hernandez

## 2017-07-13 ENCOUNTER — HOSPITAL ENCOUNTER (EMERGENCY)
Dept: HOSPITAL 42 - ED | Age: 52
Discharge: LEFT BEFORE DISCHARGE | End: 2017-07-13
Payer: COMMERCIAL

## 2017-07-13 ENCOUNTER — HOSPITAL ENCOUNTER (INPATIENT)
Dept: HOSPITAL 42 - ED | Age: 52
LOS: 1 days | Discharge: HOME | DRG: 78 | End: 2017-07-14
Attending: HOSPITALIST | Admitting: HOSPITALIST
Payer: COMMERCIAL

## 2017-07-13 VITALS — BODY MASS INDEX: 27.5 KG/M2

## 2017-07-13 VITALS — BODY MASS INDEX: 26.6 KG/M2

## 2017-07-13 VITALS
OXYGEN SATURATION: 99 % | RESPIRATION RATE: 16 BRPM | SYSTOLIC BLOOD PRESSURE: 120 MMHG | DIASTOLIC BLOOD PRESSURE: 66 MMHG

## 2017-07-13 VITALS — TEMPERATURE: 97.3 F | HEART RATE: 56 BPM

## 2017-07-13 DIAGNOSIS — R40.2412: ICD-10-CM

## 2017-07-13 DIAGNOSIS — F41.9: ICD-10-CM

## 2017-07-13 DIAGNOSIS — Z87.892: ICD-10-CM

## 2017-07-13 DIAGNOSIS — Z88.0: ICD-10-CM

## 2017-07-13 DIAGNOSIS — Z72.0: ICD-10-CM

## 2017-07-13 DIAGNOSIS — I26.99: Primary | ICD-10-CM

## 2017-07-13 DIAGNOSIS — Z86.718: ICD-10-CM

## 2017-07-13 DIAGNOSIS — Z88.8: ICD-10-CM

## 2017-07-13 DIAGNOSIS — R06.00: ICD-10-CM

## 2017-07-13 DIAGNOSIS — Z86.11: ICD-10-CM

## 2017-07-13 DIAGNOSIS — Z86.711: ICD-10-CM

## 2017-07-13 DIAGNOSIS — M54.9: ICD-10-CM

## 2017-07-13 DIAGNOSIS — I42.0: ICD-10-CM

## 2017-07-13 DIAGNOSIS — R07.9: ICD-10-CM

## 2017-07-13 DIAGNOSIS — Z87.891: ICD-10-CM

## 2017-07-13 DIAGNOSIS — M19.90: ICD-10-CM

## 2017-07-13 DIAGNOSIS — J44.9: ICD-10-CM

## 2017-07-13 DIAGNOSIS — H54.41: ICD-10-CM

## 2017-07-13 DIAGNOSIS — Z87.898: ICD-10-CM

## 2017-07-13 LAB
ALBUMIN SERPL-MCNC: 3.9 G/DL (ref 3–4.8)
ALBUMIN/GLOB SERPL: 1.6 {RATIO} (ref 1.1–1.8)
ALT SERPL-CCNC: 28 U/L (ref 7–56)
APPEARANCE UR: CLEAR
APTT BLD: 27 SECONDS (ref 23.7–30.8)
APTT BLD: 28.8 SECONDS (ref 23.7–30.8)
AST SERPL-CCNC: 19 U/L (ref 15–59)
B2 GLYCOPROT1 IGA SER-ACNC: <9 SAU (ref ?–20)
B2 GLYCOPROT1 IGG SER-ACNC: <9 SGU (ref ?–20)
B2 GLYCOPROT1 IGM SER-ACNC: <9 SMU (ref ?–20)
BILIRUB UR-MCNC: NEGATIVE MG/DL
BUN SERPL-MCNC: 17 MG/DL (ref 7–21)
CALCIUM SERPL-MCNC: 9.4 MG/DL (ref 8.4–10.5)
CAOX CRY #/AREA URNS HPF: (no result) /HPF
CARDIOLIPIN IGA SER IA-ACNC: <11 APL (ref ?–11)
CARDIOLIPIN IGG SER IA-ACNC: <14 GPL (ref ?–14)
CARDIOLIPIN IGM SER IA-ACNC: 35 MPL (ref ?–12)
COLOR UR: YELLOW
EPI CELLS #/AREA URNS HPF: (no result) /HPF (ref 0–5)
ERYTHROCYTE [DISTWIDTH] IN BLOOD BY AUTOMATED COUNT: 13.7 % (ref 11.5–14.5)
GFR NON-AFRICAN AMERICAN: > 60
GLUCOSE UR STRIP-MCNC: NEGATIVE MG/DL
HGB BLD-MCNC: 13 GM/DL (ref 14–18)
INR PPP: 1.02 (ref 0.93–1.08)
INR PPP: 1.04 (ref 0.93–1.08)
LEUKOCYTE ESTERASE UR-ACNC: NEGATIVE LEU/UL
MCH RBC QN AUTO: 29 PG (ref 25–35)
MCHC RBC AUTO-ENTMCNC: 35.2 G/DL (ref 31–37)
MCV RBC AUTO: 82.4 FL (ref 80–105)
PH UR STRIP: 6.5 [PH] (ref 4.7–8)
PHOSPHATIDYLSERINE AB IGG: <10 U/ML (ref ?–10)
PLATELET # BLD: 173 10^3/UL (ref 120–450)
PMV BLD AUTO: 9.2 FL (ref 7–11)
PROT UR STRIP-MCNC: (no result) MG/DL
PROTHROMBIN TIME: 11 SECONDS (ref 9.9–11.8)
PROTHROMBIN TIME: 11.2 SECONDS (ref 9.9–11.8)
PS IGA SER-ACNC: <20 U/ML (ref ?–20)
PS IGM SER-ACNC: <25 U/ML (ref ?–25)
RBC # BLD AUTO: 4.48 10^6/UL (ref 3.5–6.1)
RBC # UR STRIP: NEGATIVE /UL
RBC #/AREA URNS HPF: (no result) /HPF (ref 0–2)
SP GR UR STRIP: 1.02 (ref 1–1.03)
TROPONIN I SERPL-MCNC: < 0.01 NG/ML
URINE NITRATE: NEGATIVE
UROBILINOGEN UR STRIP-ACNC: 1 E.U./DL
VWF AG PPP-ACNC: 135 % (ref 50–217)
WBC # BLD AUTO: 7.1 10^3/UL (ref 4.5–11)
WBC #/AREA URNS HPF: (no result) /HPF (ref 0–6)

## 2017-07-13 PROCEDURE — 84484 ASSAY OF TROPONIN QUANT: CPT

## 2017-07-13 PROCEDURE — 99285 EMERGENCY DEPT VISIT HI MDM: CPT

## 2017-07-13 PROCEDURE — 96374 THER/PROPH/DIAG INJ IV PUSH: CPT

## 2017-07-13 PROCEDURE — 85610 PROTHROMBIN TIME: CPT

## 2017-07-13 PROCEDURE — 85027 COMPLETE CBC AUTOMATED: CPT

## 2017-07-13 PROCEDURE — 93975 VASCULAR STUDY: CPT

## 2017-07-13 PROCEDURE — 82550 ASSAY OF CK (CPK): CPT

## 2017-07-13 PROCEDURE — 81001 URINALYSIS AUTO W/SCOPE: CPT

## 2017-07-13 PROCEDURE — 93005 ELECTROCARDIOGRAM TRACING: CPT

## 2017-07-13 PROCEDURE — 71010: CPT

## 2017-07-13 PROCEDURE — 85730 THROMBOPLASTIN TIME PARTIAL: CPT

## 2017-07-13 PROCEDURE — 71275 CT ANGIOGRAPHY CHEST: CPT

## 2017-07-13 PROCEDURE — 83615 LACTATE (LD) (LDH) ENZYME: CPT

## 2017-07-13 PROCEDURE — 96376 TX/PRO/DX INJ SAME DRUG ADON: CPT

## 2017-07-13 PROCEDURE — 80053 COMPREHEN METABOLIC PANEL: CPT

## 2017-07-13 RX ADMIN — OXYCODONE HYDROCHLORIDE PRN MG: 15 TABLET ORAL at 13:16

## 2017-07-13 RX ADMIN — OXYCODONE HYDROCHLORIDE PRN MG: 15 TABLET ORAL at 20:09

## 2017-07-13 RX ADMIN — HEPARIN SODIUM AND DEXTROSE PRN MLS/HR: 5000; 5 INJECTION INTRAVENOUS at 14:35

## 2017-07-13 NOTE — RAD
HISTORY:

pain  



COMPARISON:

2/27/2017 



FINDINGS:



LUNGS:

No active pulmonary disease.



PLEURA:

No significant pleural effusion identified, no pneumothorax apparent.



CARDIOVASCULAR:

Normal.



OSSEOUS STRUCTURES:

No significant abnormalities.



VISUALIZED UPPER ABDOMEN:

Normal.



OTHER FINDINGS:

None.



IMPRESSION:

No active disease.

## 2017-07-13 NOTE — CP.PCM.PN
Subjective





- Date & Time of Evaluation


Date of Evaluation: 07/11/17


Time of Evaluation: 10:40





- Subjective


Subjective: 


Patient is breathing without issues.





Objective





- Vital Signs/Intake and Output


Vital Signs (last 24 hours): 


7/11/17:


BP is 115/65


Heart rate is in the 50s








 











Temp Pulse Resp BP Pulse Ox


 


 98.2 F   71   18   96/61 L  98 


 


 07/13/17 10:28  07/13/17 12:25  07/13/17 12:25  07/13/17 12:25  07/13/17 12:25











- Labs


Labs: 





7/11/17:  Hemoglobin is 12.5


Chemistries:


Glucose is 116





- Neck Exam


Additional comments: 





Negative JVD





- Respiratory Exam


Respiratory Exam: absent: Rales





- Cardiovascular Exam


Cardiovascular Exam: +S1, +S2





- Extremities Exam


Extremities Exam: absent: Pedal Edema





Assessment and Plan


(1) Pulmonary embolism


Status: Resolved   





(2) Dyspnea


Status: Resolved   





(3) COPD (chronic obstructive pulmonary disease)


Status: Suspected   





(4) Smoker


Status: Chronic   





- Assessment and Plan (Free Text)


Plan: 





Given these findings, the patient will be anticoagulated with plans to be 

discharged today. 


I have discussed with the patient about the need to stop smoking. The patient 

is aware of the consequences of his smoking issues. He is agreeable and 

understands the dangers of his continuos smoking.

## 2017-07-13 NOTE — CP.PCM.HP
<Anup Rey - Last Filed: 07/13/17 16:53>





History of Present Illness





- History of Present Illness


History of Present Illness: 


53yo male with history of PE present with complaint of worsening dyspnea with 

exertion and chest pain. He was seen here on 7/9/17 for same diagnosis and 

discharged home 7/11/17 with Eliquis. He came back again yesterday for 

worsening SOB and chest CT show new PE. Patient signed out AMA. States he left 

to take care of some issues. He came back to be admitted. Patient was very 

adamant about leaving during his previous admission.  Was offered meds to bed, 

but he wished for his eliquis to be sent to his pharmacy ([facility] in 

[place]) instead.  He stated pharmacy did not have his medication so he missed 

one day of Eliquis. He also complains of slight chest pain.  Denies abdominal 

pain, fevers, or chills.  








PMH: Tobacco abuse, Bronchitis, PE, and R Shoulder Pain  


PSHx: Rotator cuff surgery


All: Prednisone, Augmentin


SocHx: .5ppd for 30 years; cocaine, heroin in the past; drinks occasionally


Meds: Percocet 15 mg; Xanax. Eliquis


FamHx: Non-contributory








Present on Admission





- Present on Admission


Any Indicators Present on Admission: Yes


History of DVT/PE: Yes





Review of Systems





- Constitutional


Constitutional: As Per HPI





- Cardiovascular


Cardiovascular: As Per HPI





- Respiratory


Respiratory: As Per HPI





- Gastrointestinal


Gastrointestinal: As Per HPI





Past Patient History





- Infectious Disease


Hx of Infectious Diseases: None





- Tetanus Immunizations


Tetanus Immunization: Unknown





- Past Medical History & Family History


Past Medical History?: Yes





- Past Social History


Smoking Status: Former Smoker





- CARDIAC


Hx Cardiac Disorders: No


Other/Comment: low bp





- PULMONARY


Hx Respiratory Disorders: No


Hx Bronchitis: Yes


Hx Pulmonary Embolism: Yes (right)





- NEUROLOGICAL


Hx Neurological Disorder: No





- HEENT


Hx HEENT Problems: No





- RENAL


Hx Chronic Kidney Disease: No





- ENDOCRINE/METABOLIC


Hx Endocrine Disorders: No





- HEMATOLOGICAL/ONCOLOGICAL


Hx Blood Disorders: No





- INTEGUMENTARY


Hx Dermatological Problems: No





- MUSCULOSKELETAL/RHEUMATOLOGICAL


Hx Musculoskeletal Disorders: No


Hx Arthritis: Yes


Hx Back Pain: Yes


Hx Herniated Disk: Yes





- GASTROINTESTINAL


Hx Gastrointestinal Disorders: No





- GENITOURINARY/GYNECOLOGICAL


Hx Genitourinary Disorders: No





- PSYCHIATRIC


Hx Psychophysiologic Disorder: Yes


Hx Anxiety: Yes


Hx Depression: No


Hx Emotional Abuse: No


Hx Physical Abuse: No


Hx Substance Use: No





- SURGICAL HISTORY


Hx Cardiac Catheterization: Yes


Hx Orthopedic Surgery: Yes (right shouldert)


Other/Comment: hemorrhoid surgery, 5 avusion fx, right rotator cuff surgery, 

double hernia sx as child





- ANESTHESIA


Hx Anesthesia: Yes


Hx Anesthesia Reactions: No





Meds


Allergies/Adverse Reactions: 


 Allergies











Allergy/AdvReac Type Severity Reaction Status Date / Time


 


amoxicillin trihydrate Allergy  ANAPHYLAXIS Verified 07/13/17 17:33





[From Augmentin]     


 


potassium clavulanate Allergy  ANAPHYLAXIS Verified 07/13/17 17:33





[From Augmentin]     


 


prednimustine Allergy  RASH Verified 07/13/17 17:33


 


prednisolone Allergy  RASH Verified 07/13/17 17:33


 


prednisolone acetate, Allergy  RASH Verified 07/13/17 17:33





micronized     


 


prednisone Allergy  RASH Verified 07/13/17 17:33


 


prednylidene Allergy  RASH Verified 07/13/17 17:33


 


prednison Allergy  RASH Uncoded 07/13/17 17:33














Physical Exam





- Constitutional


Appears: Well, Non-toxic, No Acute Distress





- Head Exam


Head Exam: ATRAUMATIC, NORMOCEPHALIC





- Eye Exam


Eye Exam: EOMI





- Respiratory Exam


Respiratory Exam: Clear to Auscultation Bilateral





- Cardiovascular Exam


Cardiovascular Exam: REGULAR RHYTHM, +S1, +S2





- GI/Abdominal Exam


GI & Abdominal Exam: Soft.  absent: Tenderness





- Psychiatric Exam


Psychiatric exam: Normal Affect, Normal Mood





Results





- Vital Signs


Recent Vital Signs: 





 Last Vital Signs











Temp  98.2 F   07/13/17 10:28


 


Pulse  66   07/13/17 13:11


 


Resp  18   07/13/17 13:11


 


BP  99/65 L  07/13/17 13:11


 


Pulse Ox  97   07/13/17 13:11














- Labs


Labs: 





 Laboratory Results - last 24 hr











  07/13/17





  13:00


 


PT  11.0


 


INR  1.02


 


APTT  27.0














Assessment & Plan





- Assessment and Plan (Free Text)


Assessment: 


Mr. Romero is a 53 y/o male with a pertinent PMHx of tobacco abuse, PE and 

right sided shoulder pain for which he takes Percocet 15. He was readmitted 

today for SOB 2/2 PE.





Plan: 


1.SOB 2/2 pulmonary embolism


-Hypercouagble workup negative from previous admission


-Heparin drip 


-Stat EKG


-PTT/CBC


-Vital Signs Q8





2.Pain Control


-Cont. his home medications (Percocet 15 PO Q6 PRN)








3.Gi Proph


-Protonix





   








- Date & Time


Date: 07/13/17


Time: 16:59





<Margarita Hernandez VIVIENNE - Last Filed: 07/14/17 12:42>





Results





- Vital Signs


Recent Vital Signs: 





 Last Vital Signs











Temp  98.4 F   07/14/17 12:00


 


Pulse  58 L  07/14/17 12:00


 


Resp  16   07/14/17 12:00


 


BP  112/71   07/14/17 12:00


 


Pulse Ox  98   07/14/17 06:00














- Labs


Result Diagrams: 


 07/14/17 06:00





 07/14/17 06:00


Labs: 





 Laboratory Results - last 24 hr











  07/13/17 07/13/17 07/14/17





  13:00 20:39 02:40


 


WBC   


 


RBC   


 


Hgb   


 


Hct   


 


MCV   


 


MCH   


 


MCHC   


 


RDW   


 


Plt Count   


 


MPV   


 


Gran %   


 


Lymph % (Auto)   


 


Mono % (Auto)   


 


Eos % (Auto)   


 


Baso % (Auto)   


 


Gran #   


 


Lymph #   


 


Mono #   


 


Eos #   


 


Baso #   


 


PT  11.0  


 


INR  1.02  


 


APTT  27.0  53.7 H  77.6 H*


 


Sodium   


 


Potassium   


 


Chloride   


 


Carbon Dioxide   


 


Anion Gap   


 


BUN   


 


Creatinine   


 


Est GFR ( Amer)   


 


Est GFR (Non-Af Amer)   


 


Random Glucose   


 


Calcium   


 


Total Bilirubin   


 


AST   


 


ALT   


 


Alkaline Phosphatase   


 


Total Protein   


 


Albumin   


 


Globulin   


 


Albumin/Globulin Ratio   














  07/14/17 07/14/17 07/14/17





  06:00 06:00 06:00


 


WBC  4.8  D  


 


RBC  4.00  


 


Hgb  11.3 L  


 


Hct  33.5 L  


 


MCV  83.8  


 


MCH  28.3  


 


MCHC  33.7  


 


RDW  13.6  


 


Plt Count  155  


 


MPV  9.5  


 


Gran %  43.7 L  


 


Lymph % (Auto)  42.9 H  


 


Mono % (Auto)  10.7 H  


 


Eos % (Auto)  2.5  


 


Baso % (Auto)  0.2  


 


Gran #  2.08  


 


Lymph #  2.0  


 


Mono #  0.5  


 


Eos #  0.1  


 


Baso #  0.01  


 


PT   11.1 


 


INR   1.03 


 


APTT   


 


Sodium    142


 


Potassium    3.9


 


Chloride    108 H


 


Carbon Dioxide    27


 


Anion Gap    11


 


BUN    19


 


Creatinine    0.8


 


Est GFR ( Amer)    > 60


 


Est GFR (Non-Af Amer)    > 60


 


Random Glucose    106


 


Calcium    8.8


 


Total Bilirubin    0.4


 


AST    17


 


ALT    27


 


Alkaline Phosphatase    37 L


 


Total Protein    5.7 L


 


Albumin    3.4


 


Globulin    2.3


 


Albumin/Globulin Ratio    1.5














  07/14/17





  06:00


 


WBC 


 


RBC 


 


Hgb 


 


Hct 


 


MCV 


 


MCH 


 


MCHC 


 


RDW 


 


Plt Count 


 


MPV 


 


Gran % 


 


Lymph % (Auto) 


 


Mono % (Auto) 


 


Eos % (Auto) 


 


Baso % (Auto) 


 


Gran # 


 


Lymph # 


 


Mono # 


 


Eos # 


 


Baso # 


 


PT 


 


INR 


 


APTT  76.9 H*


 


Sodium 


 


Potassium 


 


Chloride 


 


Carbon Dioxide 


 


Anion Gap 


 


BUN 


 


Creatinine 


 


Est GFR ( Amer) 


 


Est GFR (Non-Af Amer) 


 


Random Glucose 


 


Calcium 


 


Total Bilirubin 


 


AST 


 


ALT 


 


Alkaline Phosphatase 


 


Total Protein 


 


Albumin 


 


Globulin 


 


Albumin/Globulin Ratio 














Attending/Attestation





- Attestation


I have personally seen and examined this patient.: Yes


I have fully participated in the care of the patient.: Yes


I have reviewed all pertinent clinical information: Yes


Notes (Text): 








I have seen and examined the patient at bedside. Agree with the note dictated 

above with the following additions/ exceptions:


Briefly this is 52 year old male with history of DVT of LE x2, sinus bradycardia

, history of TB s/p INH tx, unintentional weight loss, shoulder pain, tobacco 

use, recently diagnosed with PE who was sent by PMD for evaluation of shortness 

of breath.  CT scan of chest was repeated which  revealed new PE. Patient was 

found to be in no respiratory distress. Saturations were 97% on RA and RR was 

17. Discussed with hematologist who recommended to resume eliquis upon dc. 

Patient will be started on heparin drip IV.


There was also concern for colitis on his previous CT scan. Patient denies any 

GI symptoms. GI consult was obtained on his last admission and they recommend 

outpatient colonoscopy. Patient will follow up with Dr Yancey and Dr Claros. 


Dr Margarita Hernandez

## 2017-07-13 NOTE — CT
EXAM:

  CT Angiography Chest With Intravenous Contrast



CLINICAL HISTORY:

  52 years old, male; Signs and symptoms; Shortness of breath; Additional info: 

Sob/hx. Recent pe



TECHNIQUE:

  Axial computed tomographic angiography images of the chest with intravenous 

contrast using pulmonary embolism protocol.  This CT exam was performed using 

one or more of the following dose reduction techniques:  automated exposure 

control, adjustment of the mA and/or kV according to patient size, and/or use 

of iterative reconstruction technique.

  MIP reconstructed images were created and reviewed.

  Coronal and sagittal reformatted images were created and reviewed.



CONTRAST:

  96 mL of [id number] administered intravenously.



COMPARISON:

  CT - CHEST,ABD,PEL W/IV PO CONTRAST 7/9/2017 5:50:46 PM





FINDINGS:

  Pulmonary arteries: A filling defect is identified within a peripheral branch 

of the right descending pulmonary artery (series 2, images 150 through 160). 

This finding is new from prior study. No filling defects are detected within 

the main, proximal or remaining segmental pulmonary arteries.

  Aorta:  No thoracic aortic aneurysm.

  Lungs:   No mass.  No consolidation.

  Pleural spaces:  No significant effusion.  No pneumothorax.

  Heart:  No cardiomegaly.  No significant pericardial effusion. Flattening of 

the interventricular septum is identified, without discrete bowling of the 

interventricular septum. No reflux of intravenous contrast is detected into the 

hepatic veins.

Bones:  No acute fracture.

  Lymph nodes:   No pathologically enlarged lymph nodes.



IMPRESSION:     

Small filling defect within a peripheral branch of the right descending 

pulmonary artery, and new from prior study. No evidence of right heart strain.

## 2017-07-13 NOTE — ED PDOC
Arrival/HPI





- General


Chief Complaint: Shortness Of Breath


Time Seen by Provider: 07/13/17 10:30


Historian: Patient





- History of Present Illness


Narrative History of Present Illness (Text): 





07/13/17 10:53


53yo male with history of PE present with complaint of worsening dyspnea with 

exertion, cehst pain. He was seen here on 7/9/17 for same diagnosis and 

discharged home 7/11/17. He came back again yesterday for worsening SOB and 

chest CT show new PE. Patient signed out AMA. states he left to take care of 

some issues. He came back to be admitted.





Past Medical History





- Provider Review


Nursing Documentation Reviewed: Yes





- Infectious Disease


Hx of Infectious Diseases: None





- Tetanus Immunization


Tetanus Immunization: Unknown





- Past Medical History


Past Medical History: No Previous





- Cardiac


Hx Cardiac Disorders: No


Other/Comment: low bp





- Pulmonary


Hx Respiratory Disorders: No


Hx Bronchitis: Yes


Hx Pulmonary Embolism: Yes (right)





- Neurological


Hx Neurological Disorder: No





- HEENT


Hx HEENT Disorder: No





- Renal


Hx Renal Disorder: No





- Endocrine/Metabolic


Hx Endocrine Disorders: No





- Hematological/Oncological


Hx Blood Disorders: No





- Integumentary


Hx Dermatological Disorder: No





- Musculoskeletal/Rheumatological


Hx Musculoskeletal Disorders: No


Hx Arthritis: Yes


Hx Back Pain: Yes


Hx Herniated Disk: Yes





- Gastrointestinal


Hx Gastrointestinal Disorders: No





- Genitourinary/Gynecological


Hx Genitourinary Disorders: No





- Psychiatric


Hx Psychophysiologic Disorder: Yes


Hx Anxiety: Yes


Hx Depression: No


Hx Emotional Abuse: No


Hx Physical Abuse: No


Hx Substance Use: No





- Past Surgical History


Past Surgical History: No Previous





- Surgical History


Hx Cardiac Catheterization: Yes


Hx Orthopedic Surgery: Yes (right shouldert)


Other/Comment: hemorrhoid surgery, 5 avusion fx, right rotator cuff surgery, 

double hernia sx as child





- Anesthesia


Hx Anesthesia: Yes


Hx Anesthesia Reactions: No





- Suicidal Assessment


Feels Threatened In Home Enviroment: No





Family/Social History





- Physician Review


Nursing Documentation Reviewed: Yes


Family/Social History: Unknown Family HX


Smoking Status: Former Smoker


Hx Alcohol Use: Yes


Frequency of alcohol use: Socially


Amount per day: 2


Hx Substance Use: No


Hx Substance Use Treatment: No





Allergies/Home Meds


Allergies/Adverse Reactions: 


Allergies





amoxicillin trihydrate [From Augmentin] Allergy (Verified 07/13/17 10:25)


 ANAPHYLAXIS


potassium clavulanate [From Augmentin] Allergy (Verified 07/13/17 10:25)


 ANAPHYLAXIS


prednimustine Allergy (Verified 07/13/17 10:25)


 RASH


prednisolone Allergy (Verified 07/13/17 10:25)


 RASH


prednisolone acetate, micronized Allergy (Verified 07/13/17 10:25)


 RASH


prednisone Allergy (Verified 07/13/17 10:25)


 RASH


prednylidene Allergy (Verified 07/13/17 10:25)


 RASH


prednison Allergy (Uncoded 07/13/17 10:25)


 RASH








Home Medications: 


 Home Meds











 Medication  Instructions  Recorded  Confirmed


 


ALPRAZolam HALF TABLET [Xanax] 1 mg PO DAILY 02/27/17 07/13/17


 


Oxycodone HCl [Roxicodone] 15 mg PO Q6 PRN 02/27/17 07/13/17














Review of Systems





- Physician Review


All systems were reviewed & negative as marked: Yes





- Review of Systems


Constitutional: Normal


Eyes: Normal


ENT: Normal


Respiratory: SOB


Cardiovascular: Chest Pain


Gastrointestinal: Normal


Genitourinary Male: Normal


Musculoskeletal: Normal


Skin: Normal


Neurological: Normal


Endocrine: Normal


Hemo/Lymphatic: Normal


Psychiatric: Normal





Physical Exam


Vital Signs Reviewed: Yes


Vital Signs











  Temp Pulse Resp BP Pulse Ox


 


 07/13/17 12:25   71  18  96/61 L  98


 


 07/13/17 10:49   67  17  103/67  96


 


 07/13/17 10:31    24   96


 


 07/13/17 10:28  98.2 F  69  24  113/76  98











Temperature: Afebrile


Blood Pressure: Normal


Pulse: Regular


Respiratory Rate: Normal


Appearance: Positive for: Well-Appearing, Non-Toxic, Comfortable


Pain Distress: None


Mental Status: Positive for: Alert and Oriented X 3





- Systems Exam


Head: Present: Atraumatic, Normocephalic


Pupils: Present: PERRL


Extroacular Muscles: Present: EOMI


Conjunctiva: Present: Normal


Mouth: Present: Moist Mucous Membranes


Neck: Present: Normal Range of Motion


Respiratory/Chest: Present: Clear to Auscultation, Good Air Exchange.  No: 

Respiratory Distress, Accessory Muscle Use, Wheezes, Decreased Breath Sounds, 

Rales, Retracting, Rhonchi, Tachypneic


Cardiovascular: Present: Regular Rate and Rhythm, Normal S1, S2.  No: Murmurs


Abdomen: Present: Normal Bowel Sounds.  No: Tenderness, Distention, Peritoneal 

Signs


Back: Present: Normal Inspection


Upper Extremity: Present: Normal Inspection.  No: Cyanosis, Edema


Lower Extremity: Present: Normal Inspection.  No: Edema


Neurological: Present: GCS=15, CN II-XII Intact, Speech Normal


Skin: Present: Warm, Dry, Normal Color.  No: Rashes


Psychiatric: Present: Alert, Oriented x 3, Normal Insight, Normal Concentration





Medical Decision Making


ED Course and Treatment: 





07/13/17 11:01


Pt with history of PE in ED for worsening NARANJO, pleuritic chest pain.








His record from this morning was reviewed and labs reviewed. Chest CT showed 

new PE.





Case was JENNY Hutchinson. She notes that she will DC with Dr. Hernandez and make a 

decision.








Case was JENNY Hernandez. She accepted pt for admission








Disposition/Present on Arrival





- Present on Arrival


Any Indicators Present on Arrival: No


History of DVT/PE: No


History of Uncontrolled Diabetes: No


Urinary Catheter: No


History of Decub. Ulcer: No


History Surgical Site Infection Following: None





- Disposition


Have Diagnosis and Disposition been Completed?: Yes


Diagnosis: 


 Pulmonary embolism





Disposition: HOSPITALIZED


Disposition Time: 12:05


Patient Problems: 


 Current Active Problems











Problem Status Onset


 


Pulmonary embolism Acute  











Condition: FAIR

## 2017-07-13 NOTE — ED PDOC
Arrival/HPI





- General


Chief Complaint: Chest Pain


Time Seen by Provider: 07/13/17 01:05


Historian: Patient, Spouse





- History of Present Illness


Narrative History of Present Illness (Text): 


07/13/17 01:21


Edi Cordova is a 52 year old male, with a history of right shoulder surgery, 

presents to the emergency department accompanied by his wife complaining of 

chest pain and shortness of breath. Patient was seen in the emergency 

department 07/09/17 for shortness of breath, was diagnosed with right sided 

pulmonary embolism, and hospitalized. His wife reports patient was discharged 

on 07/11/17 and has been experiencing right-sided chest pain, shortness of 

breath, and headache. Wife also reports that patient regularly takes Eliquis. 

Patient also reports some testicular discomfort. Patient denies any fever, 

chills cough, nausea, vomiting, diarrhea, diaphoresis, jaw pain, or other 

complaints.





Time/Duration: < week (5 days)


Symptom Onset: Gradual


Symptom Course: Unchanged


Activities at Onset: Rest


Modifying Factors (Text): 


None


Context: Home





Past Medical History





- Provider Review


Nursing Documentation Reviewed: Yes





- Infectious Disease


Hx of Infectious Diseases: None





- Tetanus Immunization


Tetanus Immunization: Unknown





- Past Medical History


Past Medical History: No Previous





- Cardiac


Hx Cardiac Disorders: No


Other/Comment: low bp





- Pulmonary


Hx Respiratory Disorders: No


Hx Bronchitis: Yes


Hx Pulmonary Embolism: Yes (right)





- Neurological


Hx Neurological Disorder: No





- HEENT


Hx HEENT Disorder: No





- Renal


Hx Renal Disorder: No





- Endocrine/Metabolic


Hx Endocrine Disorders: No





- Hematological/Oncological


Hx Blood Disorders: No





- Integumentary


Hx Dermatological Disorder: No





- Musculoskeletal/Rheumatological


Hx Musculoskeletal Disorders: No


Hx Falls: No





- Gastrointestinal


Hx Gastrointestinal Disorders: No





- Genitourinary/Gynecological


Hx Genitourinary Disorders: No





- Psychiatric


Hx Psychophysiologic Disorder: No


Hx Depression: No


Hx Emotional Abuse: No


Hx Physical Abuse: No


Hx Substance Use: No





- Past Surgical History


Past Surgical History: No Previous





- Surgical History


Hx Cardiac Catheterization: Yes


Hx Orthopedic Surgery: Yes (right shouldert)


Other/Comment: hemorrhoid surgery, 5 avusion fx, right rotator cuff surgery





- Anesthesia


Hx Anesthesia: Yes


Hx Anesthesia Reactions: No





- Suicidal Assessment


Feels Threatened In Home Enviroment: No





Family/Social History





- Physician Review


Nursing Documentation Reviewed: Yes


Family/Social History: Unknown Family HX


Smoking Status: Current Some Days Smoker


Hx Alcohol Use: Yes


Frequency of alcohol use: Socially


Amount per day: 2


Hx Substance Use: No


Hx Substance Use Treatment: No





Allergies/Home Meds


Allergies/Adverse Reactions: 


Allergies





amoxicillin trihydrate [From Augmentin] Allergy (Verified 07/13/17 17:33)


 ANAPHYLAXIS


potassium clavulanate [From Augmentin] Allergy (Verified 07/13/17 17:33)


 ANAPHYLAXIS


prednimustine Allergy (Verified 07/13/17 17:33)


 RASH


prednisolone Allergy (Verified 07/13/17 17:33)


 RASH


prednisolone acetate, micronized Allergy (Verified 07/13/17 17:33)


 RASH


prednisone Allergy (Verified 07/13/17 17:33)


 RASH


prednylidene Allergy (Verified 07/13/17 17:33)


 RASH


prednison Allergy (Uncoded 07/13/17 17:33)


 RASH








Home Medications: 


 Home Meds











 Medication  Instructions  Recorded  Confirmed


 


ALPRAZolam HALF TABLET [Xanax] 1 mg PO DAILY 02/27/17 07/13/17


 


Oxycodone HCl [Roxicodone] 15 mg PO Q6 PRN 02/27/17 07/13/17














Review of Systems





- Physician Review


All systems were reviewed & negative as marked: Yes





- Review of Systems


Constitutional: Fevers


Eyes: Normal


ENT: absent: Rhinorrhea


Respiratory: SOB, Other (right lung pain).  absent: Cough


Cardiovascular: Chest Pain.  absent: Syncope


Gastrointestinal: absent: Abdominal Pain, Diarrhea, Nausea, Vomiting


Genitourinary Male: Other (+testicular discomfort)


Musculoskeletal: Normal.  absent: Neck Pain


Skin: Normal.  absent: Rash


Neurological: Headache


Endocrine: absent: Diaphoresis


Hemo/Lymphatic: Normal


Psychiatric: Normal





Physical Exam


Vital Signs Reviewed: Yes


Vital Signs











  Temp Pulse Resp BP Pulse Ox


 


 07/13/17 05:05   56 L  16  120/66  99


 


 07/13/17 00:54  97.3 F L  56 L  18  114/76  100











Temperature: Afebrile


Blood Pressure: Normal


Pulse: Bradycardic


Respiratory Rate: Normal


Appearance: Positive for: Well-Appearing, Non-Toxic, Comfortable


Pain Distress: None


Mental Status: Positive for: Alert and Oriented X 3





- Systems Exam


Head: Present: Atraumatic, Normocephalic


Pupils: Present: PERRL


Extroacular Muscles: Present: EOMI


Conjunctiva: Present: Normal


Mouth: Present: Moist Mucous Membranes


Pharnyx: Present: Normal


Neck: Present: Normal Range of Motion


Respiratory/Chest: Present: Good Air Exchange, Rhonchi (right lower lung)


Cardiovascular: Present: Regular Rate and Rhythm, Normal S1, S2.  No: Murmurs


Abdomen: Present: Normal Bowel Sounds.  No: Tenderness, Distention, Peritoneal 

Signs


Genitourinary Male: Present: Normal External Genitalia.  No: Penile Discharge, 

Testicle Tenderness, Masses, Hernias, Testicle Swelling


Upper Extremity: Present: Normal Inspection.  No: Cyanosis, Edema


Lower Extremity: Present: Normal Inspection.  No: Edema


Neurological: Present: GCS=15, CN II-XII Intact, Speech Normal, Motor Func 

Grossly Intact, Normal Sensory Function


Skin: Present: Warm, Dry, Normal Color.  No: Rashes


Psychiatric: Present: Alert, Oriented x 3, Normal Insight, Normal Concentration





Medical Decision Making


ED Course and Treatment: 


07/13/17 01:21


Impression:


52 year old male with shortness of breath





Plan:


-- EKG


-- Chest X-ray


-- Testicular Ultrasound


-- Labs


-- Dilaudid


-- Reassess and disposition





EKG: Ordered, reviewed, and independently interpreted the EKG.


Rate : 60 BPM


Rhythm : NSR


Interpretation : first degree AV block. Non specific ST/T changes.





Chest X-ray: No acute processes.





07/13/17 03:48


Reviewed sono: US Duplex Testes shows:


Right testicle: Unremarkable in echogenicity and size measuring 4.1 x 1.6 x 3.3 

cm. No mass. No


torsion. A small right-sided hydrocele is present.


Left testicle: Unremarkable in echogenicity and size measuring 4.3 x 1.4 x 2.9 

cm. No mass. No


torsion.


Epididymides: Unremarkable in echogenicity and size.


Scrotum: Unremarkable.


IMPRESSION:


Small right-sided hydrocele.


Otherwise, unremarkable sonographic evaluation of the scrotum, as detailed 

above.





07/13/17 04:56


Reviewed CTA Chest, shows: Small filling defect within a peripheral branch of 

the right descending pulmonary artery, and new from prior study. No evidence of 

right heart strain.





07/13/17 05:18


Case discussed with Dr. Long, who is aware and agrees with plan. 


Discussed results and plans with patient, pt was offered hospital admission for 

further evaluation. Patient now states he does not wish to stay, states he has 

an appointment at 08:00 and will return to the ER after. The patient is 

choosing to leave against medical advice.  I have personally explained to the 

patient that choosing to do so may result in permanent bodily harm or death.  I 

have discussed at great length that without further evaluation and monitoring 

there may be unforeseen circumstances and/or deterioration causing permanent 

bodily harm or death as a result of their choice. The patient is alert, oriented

, and shows the mental capacity to make clear decisions regarding the patients 

health care at this time. The patient continues to wish to leave against 

medical advice. In light of the patients decision to leave against medical 

advice, the patient is aware of the importance to following up as instructed.  

The patient has been advised that they should return to the emergency room 

immediately if they change their mind at any time, or if their condition begins 

to change or worsen in any way.











- Lab Interpretations


Lab Results: 








 07/13/17 01:58 





 07/13/17 01:58 





 Lab Results





07/13/17 05:12: Urine Color Yellow, Urine Appearance Clear, Urine pH 6.5, Ur 

Specific Gravity 1.025, Urine Protein Trace H, Urine Glucose (UA) Negative, 

Urine Ketones Trace H, Urine Blood Negative, Urine Nitrate Negative, Urine 

Bilirubin Negative, Urine Urobilinogen 1.0 H, Ur Leukocyte Esterase Negative, 

Urine RBC 0 - 2, Urine WBC 0 - 2, Ur Epithelial Cells 0 - 2, Calcium Oxalate 

Crystal Small


07/13/17 01:58: WBC 7.1  D, RBC 4.48, Hgb 13.0 L, Hct 36.9 L, MCV 82.4, MCH 29.0

, MCHC 35.2, RDW 13.7, Plt Count 173, MPV 9.2


07/13/17 01:58: Sodium 141, Potassium 3.7, Chloride 109 H, Carbon Dioxide 22, 

Anion Gap 14, BUN 17, Creatinine 0.9, Est GFR (African Amer) > 60, Est GFR (Non-

Af Amer) > 60, Random Glucose 92, Calcium 9.4, Total Bilirubin 0.6, AST 19, ALT 

28, Alkaline Phosphatase 44, Lactate Dehydrogenase 286 L, Total Creatine Kinase 

40, Troponin I < 0.01, Total Protein 6.4, Albumin 3.9, Globulin 2.5, Albumin/

Globulin Ratio 1.6


07/13/17 01:58: PT 11.2, INR 1.04, APTT 28.8








I have reviewed the lab results: Yes





- RAD Interpretation


Narrative RAD Interpretations (Text): 


CTA Chest shows:


Pulmonary arteries: A filling defect is identified within a peripheral branch 

of the right descending


pulmonary artery (series 2, images 150 through 160). This finding is new from 

prior study. No filling


defects are detected within the main, proximal or remaining segmental pulmonary 

arteries.


Aorta: No thoracic aortic aneurysm.


Lungs: No mass. No consolidation.


Pleural spaces: No significant effusion. No pneumothorax.


Heart: No cardiomegaly. No significant pericardial effusion. Flattening of the 

interventricular septum


is identified, without discrete bowling of the interventricular septum. No 

reflux of intravenous contrast


is detected into the hepatic veins.


Bones: No acute fracture.


Lymph nodes: No pathologically enlarged lymph nodes.


IMPRESSION:


Small filling defect within a peripheral branch of the right descending 

pulmonary artery, and new from


prior study. No evidence of right heart strain.





Radiology Orders: 








07/13/17 01:29


CHEST PORTABLE [RAD] Stat 





07/13/17 02:19


TESTES DUPLEX COMPLETE [US] Stat 





07/13/17 03:59


ANGIO CHEST PE PROTOCOL [CT] Stat 











: ED Physician, Radiologist





- EKG Interpretation


Interpreted by ED Physician: Yes


Type: 12 lead EKG





- Medication Orders


Current Medication Orders: 











Discontinued Medications





Hydromorphone HCl (Dilaudid)  2 mg IVP STAT STA


   Stop: 07/13/17 01:32


   Last Admin: 07/13/17 02:03  Dose: 2 mg





Hydromorphone HCl (Dilaudid)  2 mg IVP STAT STA


   Stop: 07/13/17 02:34


   Last Admin: 07/13/17 02:47  Dose: 2 mg





Iodixanol (Visipaque 320 Mg/Ml 100 Ml) Confirm Administered Dose 100 ml IV .STK-

MED ONE


   Stop: 07/13/17 04:03











- Scribe Statement


The provider has reviewed the documentation as recorded by the Scribe


07/13/17


Terrie Montiel training with Barbara Ramirez





All medical record entries made by the Scribe were at my direction and 

personally dictated by me. I have reviewed the chart and agree that the record 

accurately reflects my personal performance of the history, physical exam, 

medical decision making, and the department course for this patient. I have 

also personally directed, reviewed, and agree with the discharge instructions 

and disposition.








Disposition/Present on Arrival





- Present on Arrival


Any Indicators Present on Arrival: No


History of DVT/PE: No


History of Uncontrolled Diabetes: No


Urinary Catheter: No


History of Decub. Ulcer: No


History Surgical Site Infection Following: None





- Disposition


Have Diagnosis and Disposition been Completed?: Yes


Diagnosis: 


 Pulmonary embolism, Chest pain, Dyspnea





Disposition: AGAINST MEDICAL ADVICE


Disposition Time: 05:26


Patient Problems: 


 Current Active Problems











Problem Status Onset


 


Pulmonary embolism Acute  


 


Smoker Chronic  


 


COPD (chronic obstructive pulmonary disease) Suspected  


 


Pulmonary embolism Resolved  











Condition: STABLE


Discharge Instructions (ExitCare):  Chest Pain (ED)

## 2017-07-13 NOTE — US
EXAM:

  US Scrotum



CLINICAL HISTORY:

  52 years old, male; Pain; Scrotum pain; Additional info: Right testicular pain



TECHNIQUE:

  Real-time ultrasound of the scrotum with color Doppler and image 

documentation.



COMPARISON:

  No relevant prior studies available.



FINDINGS:

  Right testicle:  Unremarkable in echogenicity and size measuring 4.1 x 1.6 x 

3.3 cm.  No mass.  No torsion. A small right-sided hydrocele is present.

  Left testicle:  Unremarkable in echogenicity and size measuring 4.3 x 1.4 x 

2.9 cm.  No mass.  No torsion.

  Epididymides:  Unremarkable in echogenicity and size.

  Scrotum:  Unremarkable.



IMPRESSION:     

Small right-sided hydrocele.

Otherwise, unremarkable sonographic evaluation of the scrotum, as detailed 

above.

## 2017-07-13 NOTE — CARD
--------------- APPROVED REPORT --------------





EKG Measurement

Heart [id number]

[id number]

[id number]

[id number]

[id number]



<Conclusion>

Sinus rhythm with occasional premature ventricular complexes

Otherwise normal ECG

## 2017-07-13 NOTE — CARD
--------------- APPROVED REPORT --------------





EKG Measurement

Heart [id number]

[id number]

[id number]

[id number]

[id number]



<Conclusion>

Sinus rhythm with sinus arrhythmia with 1st degree AV block

Otherwise normal ECG

## 2017-07-14 VITALS
SYSTOLIC BLOOD PRESSURE: 112 MMHG | HEART RATE: 58 BPM | DIASTOLIC BLOOD PRESSURE: 71 MMHG | TEMPERATURE: 98.4 F | RESPIRATION RATE: 16 BRPM

## 2017-07-14 VITALS — OXYGEN SATURATION: 98 %

## 2017-07-14 LAB
ALBUMIN SERPL-MCNC: 3.4 G/DL (ref 3–4.8)
ALBUMIN/GLOB SERPL: 1.5 {RATIO} (ref 1.1–1.8)
ALT SERPL-CCNC: 27 U/L (ref 7–56)
AST SERPL-CCNC: 17 U/L (ref 15–59)
BASOPHILS # BLD AUTO: 0.01 K/MM3 (ref 0–2)
BASOPHILS NFR BLD: 0.2 % (ref 0–3)
BUN SERPL-MCNC: 19 MG/DL (ref 7–21)
CALCIUM SERPL-MCNC: 8.8 MG/DL (ref 8.4–10.5)
EOSINOPHIL # BLD: 0.1 10*3/UL (ref 0–0.7)
EOSINOPHIL NFR BLD: 2.5 % (ref 1.5–5)
ERYTHROCYTE [DISTWIDTH] IN BLOOD BY AUTOMATED COUNT: 13.6 % (ref 11.5–14.5)
GFR NON-AFRICAN AMERICAN: > 60
GRANULOCYTES # BLD: 2.08 10*3/UL (ref 1.4–6.5)
GRANULOCYTES NFR BLD: 43.7 % (ref 50–68)
HGB BLD-MCNC: 11.3 GM/DL (ref 14–18)
INR PPP: 1.03 (ref 0.93–1.08)
LYMPHOCYTES # BLD: 2 10*3/UL (ref 1.2–3.4)
LYMPHOCYTES NFR BLD AUTO: 42.9 % (ref 22–35)
MCH RBC QN AUTO: 28.3 PG (ref 25–35)
MCHC RBC AUTO-ENTMCNC: 33.7 G/DL (ref 31–37)
MCV RBC AUTO: 83.8 FL (ref 80–105)
MONOCYTES # BLD AUTO: 0.5 10*3/UL (ref 0.1–0.6)
MONOCYTES NFR BLD: 10.7 % (ref 1–6)
PLATELET # BLD: 155 10^3/UL (ref 120–450)
PMV BLD AUTO: 9.5 FL (ref 7–11)
PROTHROMBIN TIME: 11.1 SECONDS (ref 9.9–11.8)
RBC # BLD AUTO: 4 10^6/UL (ref 3.5–6.1)
WBC # BLD AUTO: 4.8 10^3/UL (ref 4.5–11)

## 2017-07-14 RX ADMIN — OXYCODONE HYDROCHLORIDE PRN MG: 15 TABLET ORAL at 02:09

## 2017-07-14 RX ADMIN — OXYCODONE HYDROCHLORIDE PRN MG: 15 TABLET ORAL at 09:00

## 2017-07-14 RX ADMIN — HEPARIN SODIUM AND DEXTROSE PRN MLS/HR: 5000; 5 INJECTION INTRAVENOUS at 05:13

## 2017-07-14 NOTE — CP.PCM.DIS
<Anup Rey - Last Filed: 07/14/17 10:10>





Provider





- Provider


Date of Admission: 


07/13/17 12:03





Attending physician: 


Margarita Hernandez MD





Primary care physician: 


NO PRIMARY CARE PROVIDER





Consults: 





None


Time Spent in preparation of Discharge (in minutes): 35





Hospital Course





- Lab Results


Lab Results: 


 Most Recent Lab Values











PT  11.0 Seconds (9.9-11.8)   07/13/17  13:00    


 


INR  1.02  (0.93-1.08)   07/13/17  13:00    


 


APTT  77.6 Seconds (23.7-30.8)  H*  07/14/17  02:40    














- Hospital Course


Hospital Course: 


53yo male with history of PE presented with complaint of worsening dyspnea with 

exertion and chest pain. He was seen here on 7/9/17 for same diagnosis and 

discharged home 7/11/17 with Eliquis. He came back again on 7/12 for worsening 

SOB and chest CT show new PE. Patient signed out AMA. States he left to take 

care of some issues. He came back to be admitted. He stated that his PMD 

insisted that he come in. Patient was very adamant about leaving during his 

previous admission.  Was offered meds to bed, but he wished for his eliquis to 

be sent to his pharmacy ([facility] in [place]) instead.  He stated pharmacy 

did not have his medication so he missed one day of Eliquis. Patient was put on 

a heparin drip and monitored overnight. Patient is hemodynamically stable  He 

will continue his Eliquis.  Patient is agreeable with the plan





Patient seen, examined, and discussed with attending





Anup Rey PGY1  











- Date & Time of H&P


Date of H&P: 07/14/17


Time of H&P: 10:03





Discharge Exam





- Head Exam


Head Exam: ATRAUMATIC, NORMOCEPHALIC





- ENT Exam


ENT Exam: Mucous Membranes Moist





- Neck Exam


Additional comments: 





No Lymphadenoapthy 





- Respiratory Exam


Respiratory Exam: Clear to PA & Lateral, UNREMARKABLE.  absent: Rales, Rhonchi





- Cardiovascular Exam


Cardiovascular Exam: JVD, RRR, +S1, +S2





- GI/Abdominal Exam


GI & Abdominal Exam: Normal Bowel Sounds, Soft.  absent: Tenderness





- Extremities Exam


Additional comments: 


NO Pedal Edema








- Neurological Exam


Neurological exam: Alert, Oriented x3





- Psychiatric Exam


Psychiatric exam: Normal Affect, Normal Mood





- Skin


Skin Exam: Dry, Intact, Normal Color, Warm





Discharge Plan





- Follow Up Plan


Condition: FAIR


Disposition: HOME/ ROUTINE


Instructions:  Pulmonary Embolism (DC)


Additional Instructions: 


1. Continue to take Eliquis twice per day. 





2. Follow up with Dr. Yancey in 1 week. 








Referrals: 


Lamberto Yancey MD [Staff Provider] - 


PCP,NO [Primary Care Provider] - 





<Margarita Hernandez - Last Filed: 07/14/17 13:33>





Provider





- Provider


Date of Admission: 


07/13/17 12:03





Attending physician: 


Margarita Hernandez MD





Primary care physician: 


NO PRIMARY CARE PROVIDER








Hospital Course





- Lab Results


Lab Results: 


 Most Recent Lab Values











WBC  4.8 10^3/ul (4.5-11.0)  D 07/14/17  06:00    


 


RBC  4.00 10^6/uL (3.5-6.1)   07/14/17  06:00    


 


Hgb  11.3 gm/dL (14.0-18.0)  L  07/14/17  06:00    


 


Hct  33.5 % (42.0-52.0)  L  07/14/17  06:00    


 


MCV  83.8 fL (80.0-105.0)   07/14/17  06:00    


 


MCH  28.3 pg (25.0-35.0)   07/14/17  06:00    


 


MCHC  33.7 g/dl (31.0-37.0)   07/14/17  06:00    


 


RDW  13.6 % (11.5-14.5)   07/14/17  06:00    


 


Plt Count  155 10^3/uL (120.0-450.0)   07/14/17  06:00    


 


MPV  9.5 fl (7.0-11.0)   07/14/17  06:00    


 


Gran %  43.7 % (50.0-68.0)  L  07/14/17  06:00    


 


Lymph % (Auto)  42.9 % (22.0-35.0)  H  07/14/17  06:00    


 


Mono % (Auto)  10.7 % (1.0-6.0)  H  07/14/17  06:00    


 


Eos % (Auto)  2.5 % (1.5-5.0)   07/14/17  06:00    


 


Baso % (Auto)  0.2 % (0.0-3.0)   07/14/17  06:00    


 


Gran #  2.08  (1.4-6.5)   07/14/17  06:00    


 


Lymph #  2.0  (1.2-3.4)   07/14/17  06:00    


 


Mono #  0.5  (0.1-0.6)   07/14/17  06:00    


 


Eos #  0.1  (0.0-0.7)   07/14/17  06:00    


 


Baso #  0.01 K/mm3 (0.0-2.0)   07/14/17  06:00    


 


PT  11.1 Seconds (9.9-11.8)   07/14/17  06:00    


 


INR  1.03  (0.93-1.08)   07/14/17  06:00    


 


APTT  76.9 Seconds (23.7-30.8)  H*  07/14/17  06:00    


 


Sodium  142 mmol/L (132-148)   07/14/17  06:00    


 


Potassium  3.9 mmol/L (3.6-5.0)   07/14/17  06:00    


 


Chloride  108 mmol/L ()  H  07/14/17  06:00    


 


Carbon Dioxide  27 mmol/L (21-33)   07/14/17  06:00    


 


Anion Gap  11  (10-20)   07/14/17  06:00    


 


BUN  19 mg/dL (7-21)   07/14/17  06:00    


 


Creatinine  0.8 mg/dL (0.5-1.4)   07/14/17  06:00    


 


Est GFR ( Amer)  > 60   07/14/17  06:00    


 


Est GFR (Non-Af Amer)  > 60   07/14/17  06:00    


 


Random Glucose  106 mg/dL ()   07/14/17  06:00    


 


Calcium  8.8 mg/dL (8.4-10.5)   07/14/17  06:00    


 


Total Bilirubin  0.4 mg/dL (0.2-1.3)   07/14/17  06:00    


 


AST  17 U/L (15-59)   07/14/17  06:00    


 


ALT  27 U/L (7-56)   07/14/17  06:00    


 


Alkaline Phosphatase  37 U/L ()  L  07/14/17  06:00    


 


Total Protein  5.7 g/dL (5.8-8.3)  L  07/14/17  06:00    


 


Albumin  3.4 g/dL (3.0-4.8)   07/14/17  06:00    


 


Globulin  2.3 gm/dL  07/14/17  06:00    


 


Albumin/Globulin Ratio  1.5  (1.1-1.8)   07/14/17  06:00    














Attending/Attestation





- Attestation


I have personally seen and examined this patient.: Yes


I have fully participated in the care of the patient.: Yes


I have reviewed all pertinent clinical information, including history, physical 

exam and plan: Yes


Notes (Text): 





I have seen and examined the patient at bedside. Agree with the note dictated 

above with the following additions/ exceptions:


Briefly this is 52 year old male with history of DVT of LE x2, sinus bradycardia

, history of TB s/p INH tx, unintentional weight loss, shoulder pain, tobacco 

use, recently diagnosed with PE who was sent by PMD for evaluation of shortness 

of breath.  CT scan of chest was repeated which  revealed new PE. Patient was 

found to be in no respiratory distress. Saturations were 97% on RA and RR was 

17. Discussed with hematologist who recommended to resume eliquis upon dc. 

Patient was given heparin drip IV during hospitalization. He is requesting to 

be sent home today. Denies any complaints. Called Dr Yancey's office and case 

was discussed with TAE Duenas who agreed to discharge the patient home today. 

Advised the patient to follow up in Dr Yancey's office on 7/17/2017. 

Reiterated the need to be compliant with anticoagulants. He was able to walk in 

a steady state without any shortness of breath.  


There was also concern for colitis on his previous CT scan. Patient denies any 

GI symptoms. GI consult was obtained on his last admission and they recommend 

outpatient colonoscopy. 


Patient will follow up with Dr Yancey and Dr Claros. 


Dr Margarita Hernandez

## 2017-07-18 NOTE — CP.PCM.CON
History of Present Illness





- History of Present Illness


History of Present Illness: 





62 year old male, who presents with sudden onset of dyspnea.


He was found to have an acute pulmonary embolism on CT scan.


Patient's PMHx is notable for a mild dilated cardiomyopathy. He suffered from 

chest pain; his stress test was recently negative. His EF is 50%. 


Patient currently taking Oxycodone at home





Review of Systems





- Review of Systems


Review of Systems: 





was reviewed in detail. 


No cardiac symptomatology noted. 





Past Patient History





- Infectious Disease


Hx of Infectious Diseases: None





- Tetanus Immunizations


Tetanus Immunization: Unknown





- Past Medical History & Family History


Past Medical History?: Yes





- Past Social History


Smoking Status: Former Smoker





- CARDIAC


Hx Cardiac Disorders: Yes (PTCA 3 YRS AGO)


Other/Comment: low bp





- PULMONARY


Hx Respiratory Disorders: Yes (SMOKED CIGARETTES QUIT 7-9-17)


Hx Bronchitis: Yes





- NEUROLOGICAL


Hx Neurological Disorder: No





- HEENT


Hx HEENT Problems: Yes (BLIND RIGHT EYE-)





- RENAL


Hx Chronic Kidney Disease: No





- ENDOCRINE/METABOLIC


Hx Endocrine Disorders: No





- HEMATOLOGICAL/ONCOLOGICAL


Hx Blood Disorders: No





- INTEGUMENTARY


Hx Dermatological Problems: No





- MUSCULOSKELETAL/RHEUMATOLOGICAL


Hx Musculoskeletal Disorders: Yes (ROTATOR CUFF SX,AVULSION FX,)


Hx Arthritis: Yes


Hx Back Pain: Yes


Hx Falls: No


Hx Herniated Disk: Yes





- GASTROINTESTINAL


Hx Gastrointestinal Disorders: Yes (DOUBLE HERNIA,HEMORHOIDECTOMY)


Other/Comment: GI BLEED





- GENITOURINARY/GYNECOLOGICAL


Hx Genitourinary Disorders: No





- PSYCHIATRIC


Hx Psychophysiologic Disorder: Yes


Hx Anxiety: Yes


Hx Depression: No


Hx Emotional Abuse: No


Hx Physical Abuse: No


Hx Substance Use: Yes (HEROINE COCAINE ABUSE IN THE PAST)





- SURGICAL HISTORY


Hx Cardiac Catheterization: Yes


Hx Orthopedic Surgery: Yes (right shouldert)


Other/Comment: hemorrhoid surgery, 5 avusion fx, right rotator cuff surgery, 

double hernia sx as child





- ANESTHESIA


Hx Anesthesia: Yes


Hx Anesthesia Reactions: No





Meds


Allergies/Adverse Reactions: 


 Allergies











Allergy/AdvReac Type Severity Reaction Status Date / Time


 


amoxicillin trihydrate Allergy  ANAPHYLAXIS Verified 07/13/17 17:33





[From Augmentin]     


 


potassium clavulanate Allergy  ANAPHYLAXIS Verified 07/13/17 17:33





[From Augmentin]     


 


prednimustine Allergy  RASH Verified 07/13/17 17:33


 


prednisolone Allergy  RASH Verified 07/13/17 17:33


 


prednisolone acetate, Allergy  RASH Verified 07/13/17 17:33





micronized     


 


prednisone Allergy  RASH Verified 07/13/17 17:33


 


prednylidene Allergy  RASH Verified 07/13/17 17:33


 


prednison Allergy  RASH Uncoded 07/13/17 17:33














Physical Exam





- Constitutional


Additional comments: 





/6. HR is in the 70s





- Neck Exam


Additional comments: 





Negative JVD





- Cardiovascular Exam


Cardiovascular Exam: +S1, +S2





- Extremities Exam


Additional comments: 





no edema





Results





- Vital Signs


Recent Vital Signs: 


 Last Vital Signs











Temp  98.4 F   07/14/17 12:00


 


Pulse  58 L  07/14/17 12:00


 


Resp  16   07/14/17 12:00


 


BP  112/71   07/14/17 12:00


 


Pulse Ox  98   07/14/17 06:00














- Labs


Result Diagrams: 


 07/14/17 06:00





 07/14/17 06:00





- EKG Data


EKG comments: 





unremarkable








Assessment & Plan


(1) COPD (chronic obstructive pulmonary disease)


Status: Suspected   





(2) Smoker


Status: Chronic   





- Assessment and Plan (Free Text)


Assessment: 





1. Acute pulmonary embolism 


2. Borderline dilated cardiomyopathy 


3. Chest Pain, not of cardiac origin 


4. Dyspnea


Plan: 





Given these findings, patient will be treated with IV Heparin. No further 

cardiac workup is necessary at this time.

## 2017-08-13 ENCOUNTER — HOSPITAL ENCOUNTER (EMERGENCY)
Dept: HOSPITAL 42 - ED | Age: 52
Discharge: HOME | End: 2017-08-13
Payer: COMMERCIAL

## 2017-08-13 VITALS
OXYGEN SATURATION: 100 % | HEART RATE: 64 BPM | DIASTOLIC BLOOD PRESSURE: 80 MMHG | SYSTOLIC BLOOD PRESSURE: 110 MMHG | RESPIRATION RATE: 19 BRPM

## 2017-08-13 VITALS — BODY MASS INDEX: 26.6 KG/M2

## 2017-08-13 VITALS — TEMPERATURE: 98.6 F

## 2017-08-13 DIAGNOSIS — Z98.61: ICD-10-CM

## 2017-08-13 DIAGNOSIS — R51: Primary | ICD-10-CM

## 2017-08-13 DIAGNOSIS — Z79.01: ICD-10-CM

## 2017-08-13 DIAGNOSIS — Z86.711: ICD-10-CM

## 2017-08-13 LAB
ALBUMIN SERPL-MCNC: 4.1 G/DL (ref 3–4.8)
ALBUMIN/GLOB SERPL: 1.9 {RATIO} (ref 1.1–1.8)
ALT SERPL-CCNC: 25 U/L (ref 7–56)
APTT BLD: 29.2 SECONDS (ref 23.7–30.8)
AST SERPL-CCNC: 20 U/L (ref 15–59)
BASOPHILS # BLD AUTO: 0.01 K/MM3 (ref 0–2)
BASOPHILS NFR BLD: 0.2 % (ref 0–3)
BUN SERPL-MCNC: 13 MG/DL (ref 7–21)
CALCIUM SERPL-MCNC: 9.2 MG/DL (ref 8.4–10.5)
EOSINOPHIL # BLD: 0 10*3/UL (ref 0–0.7)
EOSINOPHIL NFR BLD: 0.4 % (ref 1.5–5)
ERYTHROCYTE [DISTWIDTH] IN BLOOD BY AUTOMATED COUNT: 13.5 % (ref 11.5–14.5)
GFR NON-AFRICAN AMERICAN: > 60
GRANULOCYTES # BLD: 3.18 10*3/UL (ref 1.4–6.5)
GRANULOCYTES NFR BLD: 62.8 % (ref 50–68)
HGB BLD-MCNC: 12.6 G/DL (ref 14–18)
INR PPP: 1.07 (ref 0.93–1.08)
LYMPHOCYTES # BLD: 1.4 10*3/UL (ref 1.2–3.4)
LYMPHOCYTES NFR BLD AUTO: 28.5 % (ref 22–35)
MCH RBC QN AUTO: 28.5 PG (ref 25–35)
MCHC RBC AUTO-ENTMCNC: 34.8 G/DL (ref 31–37)
MCV RBC AUTO: 81.9 FL (ref 80–105)
MONOCYTES # BLD AUTO: 0.4 10*3/UL (ref 0.1–0.6)
MONOCYTES NFR BLD: 8.1 % (ref 1–6)
PLATELET # BLD: 191 10^3/UL (ref 120–450)
PMV BLD AUTO: 9 FL (ref 7–11)
PROTHROMBIN TIME: 11.6 SECONDS (ref 9.9–11.8)
RBC # BLD AUTO: 4.42 10^6/UL (ref 3.5–6.1)
WBC # BLD AUTO: 5.1 10^3/UL (ref 4.5–11)

## 2017-08-13 PROCEDURE — 80053 COMPREHEN METABOLIC PANEL: CPT

## 2017-08-13 PROCEDURE — 96374 THER/PROPH/DIAG INJ IV PUSH: CPT

## 2017-08-13 PROCEDURE — 85025 COMPLETE CBC W/AUTO DIFF WBC: CPT

## 2017-08-13 PROCEDURE — 85730 THROMBOPLASTIN TIME PARTIAL: CPT

## 2017-08-13 PROCEDURE — 85610 PROTHROMBIN TIME: CPT

## 2017-08-13 PROCEDURE — 96375 TX/PRO/DX INJ NEW DRUG ADDON: CPT

## 2017-08-13 PROCEDURE — 93005 ELECTROCARDIOGRAM TRACING: CPT

## 2017-08-13 PROCEDURE — 70450 CT HEAD/BRAIN W/O DYE: CPT

## 2017-08-13 PROCEDURE — 99285 EMERGENCY DEPT VISIT HI MDM: CPT

## 2017-08-13 NOTE — ED PDOC
Arrival/HPI





- General


Chief Complaint: Headache


Time Seen by Provider: 08/13/17 18:43


Historian: Patient





- History of Present Illness


Narrative History of Present Illness (Text): 


08/13/17 19:09





A 51 year old male whose past medical history includes, Pulmonary Embolism and 

is on Eliquis, presents to the Emergency department complaining of a headache 

and neck pain since earlier today. The patient denies trauma or injury, fevers, 

chills, chest pain, shortness of breath, abdominal pain, nausea, vomiting, 

diarrhea, or any other complaint. 





Time/Duration: Other (Today)


Symptom Onset: Sudden


Symptom Course: Unchanged


Activities at Onset: Rest, Light


Context: Home





Past Medical History





- Provider Review


Nursing Documentation Reviewed: Yes





- Infectious Disease


Hx of Infectious Diseases: None





- Tetanus Immunization


Tetanus Immunization: Unknown





- Past Medical History


Past Medical History: No Previous





- Cardiac


Hx Cardiac Disorders: Yes (PTCA 3 YRS AGO)


Other/Comment: low bp





- Pulmonary


Hx Respiratory Disorders: Yes (SMOKED CIGARETTES QUIT 7-9-17)


Hx Bronchitis: Yes


Hx Pulmonary Embolism: Yes (right lung)





- Neurological


Hx Neurological Disorder: No





- HEENT


Hx HEENT Disorder: Yes (BLIND RIGHT EYE-)





- Renal


Hx Renal Disorder: No





- Endocrine/Metabolic


Hx Endocrine Disorders: No





- Hematological/Oncological


Hx Blood Disorders: No





- Integumentary


Hx Dermatological Disorder: No





- Musculoskeletal/Rheumatological


Hx Musculoskeletal Disorders: Yes (ROTATOR CUFF SX,AVULSION FX,)


Hx Arthritis: Yes


Hx Back Pain: Yes


Hx Falls: No


Hx Herniated Disk: Yes





- Gastrointestinal


Hx Gastrointestinal Disorders: Yes (DOUBLE HERNIA,HEMORHOIDECTOMY)


Other/Comment: GI BLEED





- Genitourinary/Gynecological


Hx Genitourinary Disorders: No





- Psychiatric


Hx Psychophysiologic Disorder: Yes


Hx Anxiety: Yes


Hx Depression: No


Hx Emotional Abuse: No


Hx Physical Abuse: No


Hx Substance Use: Yes (HEROINE COCAINE ABUSE IN THE PAST)





- Past Surgical History


Past Surgical History: No Previous





- Surgical History


Hx Cardiac Catheterization: Yes


Hx Orthopedic Surgery: Yes (right shouldert)


Other/Comment: hemorrhoid surgery, 5 avusion fx, right rotator cuff surgery, 

double hernia sx as child





- Anesthesia


Hx Anesthesia: Yes


Hx Anesthesia Reactions: No





- Suicidal Assessment


Feels Threatened In Home Enviroment: No





Family/Social History





- Physician Review


Nursing Documentation Reviewed: Yes


Family/Social History: No Known Family HX


Smoking Status: Former Smoker


Hx Alcohol Use: Yes (ETOH USE IN THE PAST)


Amount per day: 2


Hx Substance Use: Yes (HEROINE COCAINE ABUSE IN THE PAST)


Hx Substance Use Treatment: No





Allergies/Home Meds


Allergies/Adverse Reactions: 


Allergies





amoxicillin trihydrate [From Augmentin] Allergy (Verified 07/13/17 17:33)


 ANAPHYLAXIS


potassium clavulanate [From Augmentin] Allergy (Verified 07/13/17 17:33)


 ANAPHYLAXIS


prednimustine Allergy (Verified 07/13/17 17:33)


 RASH


prednisolone Allergy (Verified 07/13/17 17:33)


 RASH


prednisolone acetate, micronized Allergy (Verified 07/13/17 17:33)


 RASH


prednisone Allergy (Verified 07/13/17 17:33)


 RASH


prednylidene Allergy (Verified 07/13/17 17:33)


 RASH


prednison Allergy (Uncoded 07/13/17 17:33)


 RASH








Home Medications: 


 Home Meds











 Medication  Instructions  Recorded  Confirmed


 


ALPRAZolam HALF TABLET [Xanax] 1 mg PO PRN PRN 02/27/17 08/13/17


 


Oxycodone HCl [Roxicodone] 15 mg PO Q6 PRN 02/27/17 08/13/17














Review of Systems





- Physician Review


All systems were reviewed & negative as marked: Yes





- Review of Systems


Constitutional: absent: Fevers, Night Sweats


Respiratory: absent: SOB


Cardiovascular: absent: Chest Pain


Gastrointestinal: absent: Abdominal Pain, Diarrhea, Nausea, Vomiting


Neurological: Headache





Physical Exam


Vital Signs Reviewed: Yes


Vital Signs











  Temp Pulse Resp BP Pulse Ox


 


 08/13/17 20:52   64  19  110/80  100


 


 08/13/17 18:37  98.6 F  65  16  108/66  98











Temperature: Afebrile


Blood Pressure: Normal


Pulse: Regular


Respiratory Rate: Normal


Appearance: Positive for: Well-Appearing, Non-Toxic, Comfortable


Pain Distress: None


Mental Status: Positive for: Alert and Oriented X 3





- Systems Exam


Head: Present: Atraumatic, Normocephalic


Pupils: Present: PERRL


Extroacular Muscles: Present: EOMI


Conjunctiva: Present: Normal


Mouth: Present: Moist Mucous Membranes


Neck: Present: Normal Range of Motion


Respiratory/Chest: Present: Clear to Auscultation, Good Air Exchange.  No: 

Respiratory Distress, Accessory Muscle Use


Cardiovascular: Present: Regular Rate and Rhythm, Normal S1, S2.  No: Murmurs


Abdomen: Present: Normal Bowel Sounds.  No: Tenderness, Distention, Peritoneal 

Signs


Back: Present: Normal Inspection


Upper Extremity: Present: Normal Inspection.  No: Cyanosis, Edema


Lower Extremity: Present: Normal Inspection.  No: Edema


Neurological: Present: GCS=15, CN II-XII Intact, Speech Normal


Skin: Present: Warm, Dry, Normal Color.  No: Rashes


Psychiatric: Present: Alert, Oriented x 3, Normal Insight, Normal Concentration





Medical Decision Making


ED Course and Treatment: 


08/13/17 19:13





Impression:


A 52 year old male with a complaint of headache and neck pain.





Plan:


-- Head CT


-- Labs


-- Benadryl and Reglan


-- Reassess and disposition





Prior Visits:


Patient was last seen on 07/13/17. The patient came in with a complaint of 

dyspnea on exertion and chest pain.





Progress Notes:





EKG:


Ordered, reviewed, and independently interpreted the EKG.


Rate :  46 BPM


Rhythm : Sinus Bradycardia


Interpretation : No ST/T- wave changes





CT Head Without Intravenous Contrast


IMPRESSION:


- No acute findings seen within the brain.


- See above for remaining findings.


Dictated and Authenticated by: Deborah Carrillo MD


08/13/2017 8:43 PM Eastern Time (US & Be)





08/13/17 22:22


pt in er making multiple requests for narcotic pain meds. nj rx reveals 

numerour narcotic rx. labs imaging neg. pt neuro intact in ner. ambulatory 

steady gait. advise outpt f/u return precautions. 





- Lab Interpretations


Lab Results: 








 08/13/17 19:04 





 08/13/17 19:04 





 Lab Results





08/13/17 19:04: Sodium 139, Potassium 4.1, Chloride 101, Carbon Dioxide 27, 

Anion Gap 15, BUN 13, Creatinine 0.8, Est GFR (African Amer) > 60, Est GFR (Non-

Af Amer) > 60, Random Glucose 98, Calcium 9.2, Total Bilirubin 0.6, AST 20, ALT 

25, Alkaline Phosphatase 48, Total Protein 6.2, Albumin 4.1, Globulin 2.2, 

Albumin/Globulin Ratio 1.9 H


08/13/17 19:04: PT 11.6, INR 1.07, APTT 29.2


08/13/17 19:04: WBC 5.1, RBC 4.42, Hgb 12.6 L, Hct 36.2 L, MCV 81.9, MCH 28.5, 

MCHC 34.8, RDW 13.5, Plt Count 191, MPV 9.0, Gran % 62.8, Lymph % (Auto) 28.5, 

Mono % (Auto) 8.1 H, Eos % (Auto) 0.4 L, Baso % (Auto) 0.2, Gran # 3.18, Lymph 

# 1.4, Mono # 0.4, Eos # 0.0, Baso # 0.01








I have reviewed the lab results: Yes





- RAD Interpretation


Radiology Orders: 








08/13/17 19:03


HEAD W/O CONTRAST [CT] Stat 














- Medication Orders


Current Medication Orders: 











Discontinued Medications





Diphenhydramine HCl (Benadryl)  25 mg IVP STAT STA


   Stop: 08/13/17 19:04


   Last Admin: 08/13/17 19:12  Dose: 25 mg


   Comments: 











Metoclopramide HCl (Reglan)  10 mg IVP STAT STA


   Stop: 08/13/17 19:04


   Last Admin: 08/13/17 19:12  Dose: 10 mg











- Scribe Statement


The provider has reviewed the documentation as recorded by the Albania Smith





Provider Scribe Attestation:


All medical record entries made by the Bruceibflor were at my direction and 

personally dictated by me. I have reviewed the chart and agree that the record 

accurately reflects my personal performance of the history, physical exam, 

medical decision making, and the department course for this patient. I have 

also personally directed, reviewed, and agree with the discharge instructions 

and disposition.











Disposition/Present on Arrival





- Present on Arrival


Any Indicators Present on Arrival: No


History of DVT/PE: Yes


History of Uncontrolled Diabetes: No


Urinary Catheter: No


History of Decub. Ulcer: No


History Surgical Site Infection Following: None





- Disposition


Have Diagnosis and Disposition been Completed?: Yes


Diagnosis: 


 Headache





Disposition: HOME/ ROUTINE


Disposition Time: 08:00


Condition: STABLE


Discharge Instructions (ExitCare):  Acute Headache (ED)


Additional Instructions: 


please follow up with your doctor/specialist. return to er with worsening 

symptoms or concerns. 


Prescriptions: 


Acetaminophen/Butalbital/Caf [Fioricet] 1 tab PO Q8 PRN #20 tab


 PRN Reason: Headache


Referrals: 


Mark Garcia MD [Staff Provider] - Follow up with primary


Po Garcia MD [Staff Provider] - Follow up with primary


Vasquez Antonio MD [Staff Provider] - Follow up with primary


Forms:  [employer] (English)

## 2017-08-13 NOTE — CT
EXAM:

  CT Head Without Intravenous Contrast



CLINICAL HISTORY:

  52 years old, male; Pain; Headache; Headache not specified; Additional info: 

HA on eliquis



TECHNIQUE:

  Axial computed tomography images of the head/brain without intravenous 

contrast.  All CT scans at this facility use one or more dose reduction 

techniques, viz.: automated exposure control; ma/kV adjustment per patient size 

(including targeted exams where dose is matched to indication; i.e. head); or 

iterative reconstruction technique.



EXAM DATE/TIME:

  8/13/2017 7:03 PM



COMPARISON:

   Prior head CT of 2/27/2017 



FINDINGS:

  BRAIN:  Diffuse, moderate cortical atrophy and ventriculomegaly, similar in 

appearance to the prior exam.  No significant acute abnormality identified.  No 

acute hemorrhage seen within the brain.  No acute extra-axial fluid collections 

visualized.  No evidence of significant mass effect within the brain.

  VENTRICLES:  See above.

  BONES/JOINTS:  Deformity of the nasal bones bilaterally, most compatible with 

old/chronic fractures.

  SOFT TISSUES:  No acute abnormality of the visualized soft tissues is seen.

  SINUSES:  Small mucus retention cysts in the bilateral maxillary sinuses.  

Remaining visualized paranasal sinuses appear clear.

  MASTOID AIR CELLS:  Mastoid air cells appear clear.



IMPRESSION:     

- No acute findings seen within the brain.

- See above for remaining findings.

## 2017-08-14 NOTE — CARD
--------------- APPROVED REPORT --------------





EKG Measurement

Heart [id number]

WI [id number]

[id number]

[id number]

[id number]



<Conclusion>

Marked sinus bradycardia

Abnormal ECG

## 2017-08-15 ENCOUNTER — HOSPITAL ENCOUNTER (EMERGENCY)
Dept: HOSPITAL 42 - ED | Age: 52
LOS: 1 days | Discharge: HOME | End: 2017-08-16
Payer: COMMERCIAL

## 2017-08-15 VITALS — BODY MASS INDEX: 21.7 KG/M2

## 2017-08-15 VITALS — TEMPERATURE: 97.6 F

## 2017-08-15 DIAGNOSIS — Z98.61: ICD-10-CM

## 2017-08-15 DIAGNOSIS — R51: Primary | ICD-10-CM

## 2017-08-15 DIAGNOSIS — Z87.891: ICD-10-CM

## 2017-08-15 DIAGNOSIS — Z86.711: ICD-10-CM

## 2017-08-15 LAB
ALBUMIN SERPL-MCNC: 4.9 G/DL (ref 3–4.8)
ALBUMIN/GLOB SERPL: 1.8 {RATIO} (ref 1.1–1.8)
ALT SERPL-CCNC: 22 U/L (ref 7–56)
APTT BLD: 27 SECONDS (ref 23.7–30.8)
AST SERPL-CCNC: 29 U/L (ref 15–59)
BASOPHILS # BLD AUTO: 0.03 K/MM3 (ref 0–2)
BASOPHILS NFR BLD: 0.5 % (ref 0–3)
BUN SERPL-MCNC: 11 MG/DL (ref 7–21)
CALCIUM SERPL-MCNC: 10.2 MG/DL (ref 8.4–10.5)
EOSINOPHIL # BLD: 0.1 10*3/UL (ref 0–0.7)
EOSINOPHIL NFR BLD: 0.8 % (ref 1.5–5)
ERYTHROCYTE [DISTWIDTH] IN BLOOD BY AUTOMATED COUNT: 13.9 % (ref 11.5–14.5)
GFR NON-AFRICAN AMERICAN: > 60
GRANULOCYTES # BLD: 2.65 10*3/UL (ref 1.4–6.5)
GRANULOCYTES NFR BLD: 43.1 % (ref 50–68)
HGB BLD-MCNC: 14.1 G/DL (ref 14–18)
INR PPP: 1.03 (ref 0.93–1.08)
LYMPHOCYTES # BLD: 2.8 10*3/UL (ref 1.2–3.4)
LYMPHOCYTES NFR BLD AUTO: 46 % (ref 22–35)
MAGNESIUM SERPL-MCNC: 2.2 MG/DL (ref 1.7–2.2)
MCH RBC QN AUTO: 29.1 PG (ref 25–35)
MCHC RBC AUTO-ENTMCNC: 36 G/DL (ref 31–37)
MCV RBC AUTO: 80.8 FL (ref 80–105)
MONOCYTES # BLD AUTO: 0.6 10*3/UL (ref 0.1–0.6)
MONOCYTES NFR BLD: 9.6 % (ref 1–6)
PLATELET # BLD: 250 10^3/UL (ref 120–450)
PMV BLD AUTO: 9.1 FL (ref 7–11)
PROTHROMBIN TIME: 11.1 SECONDS (ref 9.9–11.8)
RBC # BLD AUTO: 4.85 10^6/UL (ref 3.5–6.1)
TROPONIN I SERPL-MCNC: < 0.01 NG/ML
WBC # BLD AUTO: 6.2 10^3/UL (ref 4.5–11)

## 2017-08-15 PROCEDURE — 71010: CPT

## 2017-08-15 PROCEDURE — 99284 EMERGENCY DEPT VISIT MOD MDM: CPT

## 2017-08-15 PROCEDURE — 96374 THER/PROPH/DIAG INJ IV PUSH: CPT

## 2017-08-15 PROCEDURE — 83735 ASSAY OF MAGNESIUM: CPT

## 2017-08-15 PROCEDURE — 96375 TX/PRO/DX INJ NEW DRUG ADDON: CPT

## 2017-08-15 PROCEDURE — 80320 DRUG SCREEN QUANTALCOHOLS: CPT

## 2017-08-15 PROCEDURE — 83615 LACTATE (LD) (LDH) ENZYME: CPT

## 2017-08-15 PROCEDURE — 85025 COMPLETE CBC W/AUTO DIFF WBC: CPT

## 2017-08-15 PROCEDURE — 71275 CT ANGIOGRAPHY CHEST: CPT

## 2017-08-15 PROCEDURE — 84484 ASSAY OF TROPONIN QUANT: CPT

## 2017-08-15 PROCEDURE — 93005 ELECTROCARDIOGRAM TRACING: CPT

## 2017-08-15 PROCEDURE — 80053 COMPREHEN METABOLIC PANEL: CPT

## 2017-08-15 PROCEDURE — 85610 PROTHROMBIN TIME: CPT

## 2017-08-15 PROCEDURE — 82550 ASSAY OF CK (CPK): CPT

## 2017-08-15 PROCEDURE — 85730 THROMBOPLASTIN TIME PARTIAL: CPT

## 2017-08-15 PROCEDURE — 84100 ASSAY OF PHOSPHORUS: CPT

## 2017-08-15 PROCEDURE — 96376 TX/PRO/DX INJ SAME DRUG ADON: CPT

## 2017-08-15 PROCEDURE — 70450 CT HEAD/BRAIN W/O DYE: CPT

## 2017-08-15 NOTE — CT
EXAM:

  CT Head Without Intravenous Contrast



CLINICAL HISTORY:

  52 years old, male; Pain; Headache; Headache not specified; Additional info: 

HA



TECHNIQUE:

  Axial computed tomography images of the head/brain without intravenous 

contrast.  All CT scans at this facility use one or more dose reduction 

techniques, viz.: automated exposure control; ma/kV adjustment per patient size 

(including targeted exams where dose is matched to indication; i.e. head); or 

iterative reconstruction technique.



COMPARISON:

  CT - HEAD W/O CONTRAST 8/13/2017 7:41:11 PM



FINDINGS:

  Brain:  No intracranial hemorrhage.  No mass.  No definite edema.

  Ventricles:  No hydrocephalus.

  Bones/joints:  No acute fracture.

  Soft tissues:  Unremarkable.

  Sinuses:  Minimal mucosal thickening/retention cysts of maxillary sinuses.

  Mastoid air cells:  No mastoid effusion.

  Orbits:  Unremarkable as visualized.



IMPRESSION:     

1.  No definite acute intracranial abnormality.

2.  Incidental/non-acute findings are described above.

## 2017-08-15 NOTE — ED PDOC
Arrival/HPI





- General


Chief Complaint: Shortness Of Breath


Time Seen by Provider: 08/15/17 21:43


Historian: Patient





- History of Present Illness


Narrative History of Present Illness (Text): 





08/15/17 22:16


52 year old male whose past medical history includes, Pulmonary Embolism, 

presents to the emergency department by EMS complaining of shortness of breathe 

chest pain  and headache . Patient states to have nausea and vomiting, but 

denies any fever, chills, urinary symptoms, back pain, neck pain, headache, 

dizziness, or any other complaints. 


08/16/17 06:36





08/16/17 06:36





Time/Duration: Prior to Arrival


Symptom Onset: Gradual


Symptom Course: Unchanged


Context: Home





Past Medical History





- Provider Review


Nursing Documentation Reviewed: Yes





- Infectious Disease


Hx of Infectious Diseases: None





- Tetanus Immunization


Tetanus Immunization: Unknown





- Past Medical History


Past Medical History: No Previous





- Cardiac


Hx Cardiac Disorders: Yes (PTCA 3 YRS AGO)


Other/Comment: low bp





- Pulmonary


Hx Respiratory Disorders: Yes (SMOKED CIGARETTES QUIT 7-9-17)


Hx Bronchitis: Yes


Hx Pulmonary Embolism: Yes (right lung)





- Neurological


Hx Neurological Disorder: No





- HEENT


Hx HEENT Disorder: Yes (BLIND RIGHT EYE-)





- Renal


Hx Renal Disorder: No





- Endocrine/Metabolic


Hx Endocrine Disorders: No





- Hematological/Oncological


Hx Blood Disorders: No





- Integumentary


Hx Dermatological Disorder: No





- Musculoskeletal/Rheumatological


Hx Musculoskeletal Disorders: Yes (ROTATOR CUFF SX,AVULSION FX,)


Hx Arthritis: Yes


Hx Back Pain: Yes


Hx Falls: No


Hx Herniated Disk: Yes





- Gastrointestinal


Hx Gastrointestinal Disorders: Yes (DOUBLE HERNIA,HEMORHOIDECTOMY)


Other/Comment: GI BLEED





- Genitourinary/Gynecological


Hx Genitourinary Disorders: No





- Psychiatric


Hx Psychophysiologic Disorder: Yes


Hx Anxiety: Yes


Hx Depression: No


Hx Emotional Abuse: No


Hx Physical Abuse: No


Hx Substance Use: Yes (HEROINE COCAINE ABUSE IN THE PAST)





- Past Surgical History


Past Surgical History: No Previous





- Surgical History


Hx Cardiac Catheterization: Yes


Hx Orthopedic Surgery: Yes (right shouldert)


Other/Comment: hemorrhoid surgery, 5 avusion fx, right rotator cuff surgery, 

double hernia sx as child





- Anesthesia


Hx Anesthesia: Yes


Hx Anesthesia Reactions: No





- Suicidal Assessment


Feels Threatened In Home Enviroment: No





Family/Social History





- Physician Review


Nursing Documentation Reviewed: Yes


Family/Social History: No Known Family HX


Smoking Status: Former Smoker


Hx Alcohol Use: Yes (ETOH USE IN THE PAST)


Amount per day: 2


Hx Substance Use: Yes (HEROINE COCAINE ABUSE IN THE PAST)


Hx Substance Use Treatment: No





Allergies/Home Meds


Allergies/Adverse Reactions: 


Allergies





amoxicillin trihydrate [From Augmentin] Allergy (Verified 07/13/17 17:33)


 ANAPHYLAXIS


potassium clavulanate [From Augmentin] Allergy (Verified 07/13/17 17:33)


 ANAPHYLAXIS


prednimustine Allergy (Verified 07/13/17 17:33)


 RASH


prednisolone Allergy (Verified 07/13/17 17:33)


 RASH


prednisolone acetate, micronized Allergy (Verified 07/13/17 17:33)


 RASH


prednisone Allergy (Verified 07/13/17 17:33)


 RASH


prednylidene Allergy (Verified 07/13/17 17:33)


 RASH


prednison Allergy (Uncoded 07/13/17 17:33)


 RASH








Home Medications: 


 Home Meds











 Medication  Instructions  Recorded  Confirmed


 


ALPRAZolam HALF TABLET [Xanax] 1 mg PO PRN PRN 02/27/17 08/13/17


 


Oxycodone HCl [Roxicodone] 15 mg PO Q6 PRN 02/27/17 08/15/17














Review of Systems





- Physician Review


All systems were reviewed & negative as marked: Yes





- Review of Systems


Constitutional: absent: Fevers, Other (Chills)


Respiratory: SOB


Cardiovascular: Chest Pain


Gastrointestinal: Nausea, Vomiting


Genitourinary Male: Normal.  absent: Dysuria, Frequency, Hematuria


Musculoskeletal: absent: Back Pain, Neck Pain


Neurological: Headache.  absent: Dizziness





Physical Exam


Vital Signs Reviewed: Yes


Vital Signs











  Temp Pulse Resp BP Pulse Ox


 


 08/16/17 00:55   78  16  100/57 L  97


 


 08/15/17 22:38   80  18  109/67  100


 


 08/15/17 22:21  97.6 F  90  20  109/67  100


 


 08/15/17 21:47   115 H  20  119/78  100











Temperature: Afebrile


Blood Pressure: Normal


Pulse: Tachycardic


Respiratory Rate: Normal


Appearance: Positive for: Well-Appearing, Non-Toxic, Uncomfortable


Pain Distress: None


Mental Status: Positive for: Alert and Oriented X 3





- Systems Exam


Head: Present: Atraumatic, Normocephalic


Pupils: Present: PERRL


Extroacular Muscles: Present: EOMI


Conjunctiva: Present: Normal


Mouth: Present: Moist Mucous Membranes


Neck: Present: Normal Range of Motion


Respiratory/Chest: Present: Clear to Auscultation, Good Air Exchange.  No: 

Respiratory Distress, Accessory Muscle Use


Cardiovascular: Present: Normal S1, S2, Tachycardic.  No: Murmurs


Abdomen: Present: Normal Bowel Sounds.  No: Tenderness, Distention, Peritoneal 

Signs


Back: Present: Normal Inspection


Upper Extremity: Present: Normal Inspection.  No: Cyanosis, Edema


Lower Extremity: Present: Normal Inspection.  No: Edema


Neurological: Present: GCS=15, CN II-XII Intact, Speech Normal


Skin: Present: Warm, Dry, Normal Color.  No: Rashes


Psychiatric: Present: Alert, Oriented x 3, Normal Insight, Normal Concentration





Medical Decision Making


ED Course and Treatment: 





08/15/17 22:16


Impression:


52 year old male present for shortness of breathe and chest pain. Associated 

symptoms include severe headache, nausea, and vomiting. 





Plan:


-- Head CT


-- Chest CT


-- EKG


-- Labs


-- Chest X-ray


-- Dilaudid


-- IV Fluids


-- Zofran Inj


-- Urinalysis


-- Reassess and disposition





Prior Visits:


Notes and results from previous visits were reviewed.


On 08/13/2017  patient came in complaining of headache and neck pain. 





Progress Notes:


08/15/17 22:49


EKG shows Sinus Tachycardia at 106 BPM with nonspecific ST/T waves. Interpreted 

by me.








EXAM:


CT Head Without Intravenous Contrast


08/15/2017 10:54 PM


Dictated and Authenticated by: Reese Robles MD


IMPRESSIONS:


1. No definite acute intracranial abnormality.


2. Incidental/non-acute findings are described above.





08/15/17 23:56


CXR Impression: As read by me, shows no acute processes.





EXAM:


CT Angiography Chest With Intravenous Contrast


08/16/2017 12:40 AM


Dictated and Authenticated by: Reese Robles MD


1. No definite CT evidence of pulmonary embolism.


2. Pulmonary nodules. For low-risk patients, no follow-up is necessary. For high

-risk patients


(smoking history or other known risk factors) an optional CT at 12 months could 

be performed.


3. Incidental/non-acute findings are described above.





Re-evaluation Time: 00:51


Reassessment Condition: Re-examined, Improved





- Lab Interpretations


Lab Results: 








 08/15/17 21:59 





 08/15/17 21:59 





 Lab Results





08/15/17 21:59: Alcohol, Quantitative 34 H


08/15/17 21:59: Sodium 142, Potassium 4.0, Chloride 106, Carbon Dioxide 18 L, 

Anion Gap 22 H, BUN 11, Creatinine 0.8, Est GFR (African Amer) > 60, Est GFR (

Non-Af Amer) > 60, Random Glucose 91, Calcium 10.2, Phosphorus 1.6 L, Magnesium 

2.2, Total Bilirubin 0.8, AST 29, ALT 22, Alkaline Phosphatase 47, Lactate 

Dehydrogenase 467, Total Creatine Kinase 60, Troponin I < 0.01, Total Protein 

7.6, Albumin 4.9 H, Globulin 2.7, Albumin/Globulin Ratio 1.8


08/15/17 21:59: PT 11.1, INR 1.03, APTT 27.0


08/15/17 21:59: WBC 6.2  D, RBC 4.85, Hgb 14.1, Hct 39.2 L, MCV 80.8, MCH 29.1, 

MCHC 36.0, RDW 13.9, Plt Count 250, MPV 9.1, Gran % 43.1 L, Lymph % (Auto) 46.0 

H, Mono % (Auto) 9.6 H, Eos % (Auto) 0.8 L, Baso % (Auto) 0.5, Gran # 2.65, 

Lymph # 2.8, Mono # 0.6, Eos # 0.1, Baso # 0.03








I have reviewed the lab results: Yes





- RAD Interpretation


Radiology Orders: 








08/15/17 21:49


HEAD W/O CONTRAST [CT] Stat 





08/15/17 21:50


CHEST PORTABLE [RAD] Stat 





08/15/17 22:48


ANGIO CHEST PE PROTOCOL [CT] Stat 














- EKG Interpretation


Interpreted by ED Physician: Yes





- Medication Orders


Current Medication Orders: 











Discontinued Medications





Hydromorphone HCl (Dilaudid)  2 mg IVP STAT STA


   Stop: 08/15/17 21:51


   Last Admin: 08/15/17 22:01  Dose: 2 mg





Hydromorphone HCl (Dilaudid)  2 mg IVP STAT STA


   Stop: 08/15/17 22:22


   Last Admin: 08/15/17 22:33  Dose: 2 mg





Hydromorphone HCl (Dilaudid)  2 mg IVP STAT STA


   Stop: 08/15/17 22:48


   Last Admin: 08/15/17 23:12  Dose: 2 mg





Sodium Chloride (Sodium Chloride 0.9%)  500 mls @ 1,000 mls/hr IV .Q30M MONIQUE


   Last Admin: 08/15/17 22:15  Dose: 1,000 mls/hr





Iohexol (Omnipaque 350 100 Ml) Confirm Administered Dose 350 mg .ROUTE .STK-MED 

ONE


   Stop: 08/15/17 23:16


Metoclopramide HCl (Reglan)  10 mg IVP ONCE ONE


   Stop: 08/15/17 23:04


   Last Admin: 08/15/17 23:11  Dose: 10 mg





Ondansetron HCl (Zofran Inj)  4 mg IVP STAT STA


   Stop: 08/15/17 21:51


   Last Admin: 08/15/17 22:01  Dose: 4 mg











- Scribe Statement


The provider has reviewed the documentation as recorded by the Bruceibflor Pal


All medical record entries made by the [place] were at my direction and 

personally dictated by me. I have reviewed the chart and agree that the record 

accurately reflects my personal performance of the history, physical exam, 

medical decision making, and the department course for this patient. I have 

also personally directed, reviewed, and agree with the discharge instructions 

and disposition.





Disposition/Present on Arrival





- Present on Arrival


Any Indicators Present on Arrival: No


History of DVT/PE: Yes


History of Uncontrolled Diabetes: No


Urinary Catheter: No


History of Decub. Ulcer: No


History Surgical Site Infection Following: None





- Disposition


Have Diagnosis and Disposition been Completed?: Yes


Diagnosis: 


 Intractable headache





Disposition: HOME/ ROUTINE


Disposition Time: 00:52


Condition: GOOD


Discharge Instructions (ExitCare):  Acute Headache (ED)


Referrals: 


Lamberto Yancey MD [Primary Care Provider] - Follow up with primary


Forms:  [employer] (English)

## 2017-08-16 VITALS
SYSTOLIC BLOOD PRESSURE: 100 MMHG | RESPIRATION RATE: 16 BRPM | OXYGEN SATURATION: 97 % | HEART RATE: 78 BPM | DIASTOLIC BLOOD PRESSURE: 57 MMHG

## 2017-08-16 NOTE — RAD
HISTORY:

cp  



COMPARISON:

1/28/2015 semi-erect portable chest 



FINDINGS:



LUNGS:

No consolidation. Sub cm scattered pulmonary nodules/granulomas - 

similar-appearing. 



PLEURA:

No significant pleural effusion identified, no pneumothorax apparent.



CARDIOVASCULAR:

The prominent heart size currently may be technical 



OSSEOUS STRUCTURES:

No significant abnormalities.



VISUALIZED UPPER ABDOMEN:

Normal.



OTHER FINDINGS:

None.



IMPRESSION:

No active disease

## 2017-08-16 NOTE — CT
EXAM:

  CT Angiography Chest With Intravenous Contrast



CLINICAL HISTORY:

  52 years old, male; Pain; Chest pain



TECHNIQUE:

  Axial computed tomographic angiography images of the chest with intravenous 

contrast using pulmonary embolism protocol.  All CT scans at this facility use 

one or more dose reduction techniques, viz.: automated exposure control; ma/kV 

adjustment per patient size (including targeted exams where dose is matched to 

indication; i.e. head); or iterative reconstruction technique.

  MIP reconstructed images were created and reviewed.

  Coronal and sagittal reformatted images were created and reviewed.



CONTRAST:

  93 mL of omni 350 administered intravenously.



COMPARISON:

  CT - ANGIO CHEST PE PROTOCOL 7/13/2017 4:13:36 AM



FINDINGS:

  Limitations:  Motion artifact - mild.

  Pulmonary arteries:  No definite pulmonary embolism.

  Aorta:  No aneurysm.  No dissection.

  Lungs:  Mild paraseptal emphysematous changes.  No consolidation.  Few 

calcified granulomas.  Few noncalcified pulmonary nodules, up to 0.3 cm.

  Pleural space:  No significant effusion.  No pneumothorax.

  Heart:  No cardiomegaly.  No significant pericardial effusion.

  Bones/joints:  No acute fracture.  No dislocation.

  Soft tissues:  Unremarkable.

  Lymph nodes:  No pathologically enlarged lymph nodes.



IMPRESSION:     

1.  No definite CT evidence of pulmonary embolism.

2.  Pulmonary nodules.  For low-risk patients, no follow-up is necessary.  For 

high-risk patients (smoking history or other known risk factors) an optional CT 

at 12 months could be performed.

3.  Incidental/non-acute findings are described above.

## 2017-08-16 NOTE — CARD
--------------- APPROVED REPORT --------------





EKG Measurement

Heart [id number]

ID [id number]

[id number]

[id number]

[id number]



<Conclusion>

Sinus tachycardia

Nonspecific T wave abnormality

Abnormal ECG

## 2018-01-02 ENCOUNTER — HOSPITAL ENCOUNTER (EMERGENCY)
Dept: HOSPITAL 42 - ED | Age: 53
Discharge: HOME | End: 2018-01-02
Payer: COMMERCIAL

## 2018-01-02 VITALS — RESPIRATION RATE: 18 BRPM | TEMPERATURE: 98.7 F | OXYGEN SATURATION: 96 %

## 2018-01-02 VITALS — DIASTOLIC BLOOD PRESSURE: 71 MMHG | SYSTOLIC BLOOD PRESSURE: 118 MMHG | HEART RATE: 59 BPM

## 2018-01-02 VITALS — BODY MASS INDEX: 28.6 KG/M2

## 2018-01-02 DIAGNOSIS — J40: Primary | ICD-10-CM

## 2018-01-02 DIAGNOSIS — Z87.891: ICD-10-CM

## 2018-01-02 LAB
ALBUMIN SERPL-MCNC: 4.6 G/DL (ref 3–4.8)
ALBUMIN/GLOB SERPL: 1.6 {RATIO} (ref 1.1–1.8)
ALT SERPL-CCNC: 28 U/L (ref 7–56)
AST SERPL-CCNC: 30 U/L (ref 17–59)
BASOPHILS # BLD AUTO: 0 K/MM3 (ref 0–2)
BASOPHILS NFR BLD: 0 % (ref 0–3)
BNP SERPL-MCNC: 164 PG/ML (ref 0–450)
BUN SERPL-MCNC: 15 MG/DL (ref 7–21)
CALCIUM SERPL-MCNC: 9 MG/DL (ref 8.4–10.5)
EOSINOPHIL # BLD: 0 10*3/UL (ref 0–0.7)
EOSINOPHIL NFR BLD: 0.2 % (ref 1.5–5)
ERYTHROCYTE [DISTWIDTH] IN BLOOD BY AUTOMATED COUNT: 13.9 % (ref 11.5–14.5)
GFR NON-AFRICAN AMERICAN: > 60
GRANULOCYTES # BLD: 4.85 10*3/UL (ref 1.4–6.5)
GRANULOCYTES NFR BLD: 77 % (ref 50–68)
HGB BLD-MCNC: 13.6 G/DL (ref 14–18)
LYMPHOCYTES # BLD: 1 10*3/UL (ref 1.2–3.4)
LYMPHOCYTES NFR BLD AUTO: 16.3 % (ref 22–35)
MCH RBC QN AUTO: 29.5 PG (ref 25–35)
MCHC RBC AUTO-ENTMCNC: 33.9 G/DL (ref 31–37)
MCV RBC AUTO: 87 FL (ref 80–105)
MONOCYTES # BLD AUTO: 0.4 10*3/UL (ref 0.1–0.6)
MONOCYTES NFR BLD: 6.5 % (ref 1–6)
PLATELET # BLD: 193 10^3/UL (ref 120–450)
PMV BLD AUTO: 9.4 FL (ref 7–11)
RBC # BLD AUTO: 4.61 10^6/UL (ref 3.5–6.1)
TROPONIN I SERPL-MCNC: < 0.01 NG/ML
WBC # BLD AUTO: 6.3 10^3/UL (ref 4.5–11)

## 2018-01-02 PROCEDURE — 85025 COMPLETE CBC W/AUTO DIFF WBC: CPT

## 2018-01-02 PROCEDURE — 93970 EXTREMITY STUDY: CPT

## 2018-01-02 PROCEDURE — 80053 COMPREHEN METABOLIC PANEL: CPT

## 2018-01-02 PROCEDURE — 93005 ELECTROCARDIOGRAM TRACING: CPT

## 2018-01-02 PROCEDURE — 71045 X-RAY EXAM CHEST 1 VIEW: CPT

## 2018-01-02 PROCEDURE — 84484 ASSAY OF TROPONIN QUANT: CPT

## 2018-01-02 PROCEDURE — 83880 ASSAY OF NATRIURETIC PEPTIDE: CPT

## 2018-01-02 PROCEDURE — 71275 CT ANGIOGRAPHY CHEST: CPT

## 2018-01-02 PROCEDURE — 99282 EMERGENCY DEPT VISIT SF MDM: CPT

## 2018-01-02 NOTE — ED PDOC
Arrival/HPI





- General


Chief Complaint: Flu-like Symptoms


Time Seen by Provider: 01/02/18 11:39


Historian: Patient





- History of Present Illness


Narrative History of Present Illness (Text): 


01/02/18 11:32


52 year old male, whose history includes pulmonary embolism in July and August 2017, presents to the Emergency department complaining of a cough for a couple 

weeks. Patient experiences pain with coughing and deep breaths. Patient also 

complains of fever, night sweats, and wheezing. Patient was evaluated by Dr. Yancey this morning and was advised to seek Emergency department evaluation 

for admission. Patient denies any nausea, vomiting, diarrhea, urinary symptoms, 

back pain, neck pain, headache, dizziness or any other complaints.


Time/Duration: > week


Symptom Onset: Gradual


Symptom Course: Unchanged


Modifying Factors (Text): 


Pain is worsened with cough and deep breaths.


Context: Home





Past Medical History





- Provider Review


Nursing Documentation Reviewed: Yes





- Infectious Disease


Hx of Infectious Diseases: None





- Tetanus Immunization


Tetanus Immunization: Unknown





- Past Medical History


Past Medical History: No Previous





- Cardiac


Hx Cardiac Disorders: Yes (PTCA 3 YRS AGO)


Other/Comment: low bp





- Pulmonary


Hx Respiratory Disorders: Yes (SMOKED CIGARETTES QUIT 7-9-17)


Hx Bronchitis: Yes


Hx Pulmonary Embolism: Yes (right lung)





- Neurological


Hx Neurological Disorder: No





- HEENT


Hx HEENT Disorder: Yes (BLIND RIGHT EYE-)





- Renal


Hx Renal Disorder: No





- Endocrine/Metabolic


Hx Endocrine Disorders: No





- Hematological/Oncological


Hx Blood Disorders: No





- Integumentary


Hx Dermatological Disorder: No





- Musculoskeletal/Rheumatological


Hx Musculoskeletal Disorders: Yes (ROTATOR CUFF SX,AVULSION FX,)


Hx Arthritis: Yes


Hx Back Pain: Yes


Hx Falls: No


Hx Herniated Disk: Yes





- Gastrointestinal


Hx Gastrointestinal Disorders: Yes (DOUBLE HERNIA,HEMORHOIDECTOMY)


Other/Comment: GI BLEED





- Genitourinary/Gynecological


Hx Genitourinary Disorders: No





- Psychiatric


Hx Psychophysiologic Disorder: Yes


Hx Anxiety: Yes


Hx Depression: No


Hx Emotional Abuse: No


Hx Physical Abuse: No


Hx Substance Use: Yes (HEROINE COCAINE ABUSE IN THE PAST)





- Past Surgical History


Past Surgical History: No Previous





- Surgical History


Hx Cardiac Catheterization: Yes


Hx Orthopedic Surgery: Yes (right shouldert)


Other/Comment: hemorrhoid surgery, 5 avusion fx, right rotator cuff surgery, 

double hernia sx as child





- Anesthesia


Hx Anesthesia: Yes


Hx Anesthesia Reactions: No





- Suicidal Assessment


Feels Threatened In Home Enviroment: No





Family/Social History





- Physician Review


Nursing Documentation Reviewed: Yes


Family/Social History: Blood Clots


Smoking Status: Former Smoker


Hx Alcohol Use: Yes (ETOH USE IN THE PAST)


Amount per day: 2


Hx Substance Use: Yes (HEROINE COCAINE ABUSE IN THE PAST)


Hx Substance Use Treatment: No





Allergies/Home Meds


Allergies/Adverse Reactions: 


Allergies





amoxicillin trihydrate [From Augmentin] Allergy (Verified 07/13/17 17:33)


 ANAPHYLAXIS


potassium clavulanate [From Augmentin] Allergy (Verified 07/13/17 17:33)


 ANAPHYLAXIS


prednimustine Allergy (Verified 07/13/17 17:33)


 RASH


prednisolone Allergy (Verified 07/13/17 17:33)


 RASH


prednisolone acetate, micronized Allergy (Verified 07/13/17 17:33)


 RASH


prednisone Allergy (Verified 07/13/17 17:33)


 RASH


prednylidene Allergy (Verified 07/13/17 17:33)


 RASH


prednison Allergy (Uncoded 07/13/17 17:33)


 RASH








Home Medications: 


 Home Meds











 Medication  Instructions  Recorded  Confirmed


 


Albuterol 0.083% [Albuterol 0.083% 1 vial IH QID 01/02/18 01/02/18





Inhal Sol (2.5 mg/3 ml) UD]   


 


Apixaban [Eliquis] 1 tab PO BID 01/02/18 01/02/18


 


Folic Acid [Folic Acid] 1 tab PO DAILY 01/02/18 01/02/18


 


HYDROmorphone [Dilaudid] 1 tab PO DAILY 01/02/18 01/02/18


 


Metoprolol Succinate [Toprol XL] 1 tab PO DAILY 01/02/18 01/02/18


 


Omeprazole [Omeprazole] 1 cap PO BID 01/02/18 01/02/18


 


oxyCODONE [oxyCODONE Immediate 1 tab PO DAILY PRN 01/02/18 01/02/18





Release Tab]   














Review of Systems





- Physician Review


All systems were reviewed & negative as marked: Yes





- Review of Systems


Constitutional: Fevers, Night Sweats


Respiratory: Cough, Wheezing


Gastrointestinal: absent: Diarrhea, Nausea, Vomiting


Genitourinary Male: absent: Dysuria


Musculoskeletal: absent: Back Pain, Neck Pain


Neurological: absent: Headache, Dizziness





Physical Exam


Vital Signs Reviewed: Yes


Vital Signs











  Temp Pulse Resp BP Pulse Ox


 


 01/02/18 12:50   59 L  18  118/71  96


 


 01/02/18 11:29  98.7 F  56 L  18  116/73  96











Temperature: Afebrile


Blood Pressure: Normal


Pulse: Bradycardic


Respiratory Rate: Normal


Appearance: Positive for: Non-Toxic, Comfortable


Pain Distress: None


Mental Status: Positive for: Alert and Oriented X 3





- Systems Exam


Head: Present: Atraumatic, Normocephalic


Pupils: Present: PERRL


Mouth: Present: Moist Mucous Membranes


Neck: Present: Normal Range of Motion


Respiratory/Chest: Present: Decreased Breath Sounds (bilateral lower regions).  

No: Accessory Muscle Use, Wheezes, Rales


Cardiovascular: Present: Regular Rate and Rhythm, Normal S1, S2.  No: Murmurs


Abdomen: Present: Normal Bowel Sounds.  No: Tenderness, Distention, Peritoneal 

Signs


Lower Extremity: Present: CALF TENDERNESS (subjective tenderness to left calf).

  No: Edema, Swelling


Neurological: Present: GCS=15, CN II-XII Intact, Speech Normal


Psychiatric: Present: Alert, Oriented x 3, Normal Insight, Normal Concentration





Medical Decision Making


ED Course and Treatment: 


01/02/18 11:50


Impression:


52 year old male presents to the Emergency department complaining of cough, 

fever, and night sweats.





Plan:


-- Chest CT scan with contrast


-- EKG


-- Chest xray


-- US lower extremities


-- Labs


-- IV fluids


-- Reassess and disposition





Progress Notes:


01/02/18 13:42


Labs reviewed; all negative including a negative Troponin.





01/02/18 14:43


Patient will be discharged home with Azithromycin.





- Lab Interpretations


Lab Results: 








 01/02/18 12:49 





 01/02/18 12:49 





 Lab Results





01/02/18 12:49: Sodium 142, Potassium 4.1, Chloride 104, Carbon Dioxide 28, 

Anion Gap 15, BUN 15, Creatinine 0.8, Est GFR (African Amer) > 60, Est GFR (Non-

Af Amer) > 60, Random Glucose 99, Calcium 9.0, Total Bilirubin 0.5, AST 30, ALT 

28, Alkaline Phosphatase 57, Troponin I < 0.01, NT-Pro-B Natriuret Pep 164, 

Total Protein 7.5, Albumin 4.6, Globulin 2.9, Albumin/Globulin Ratio 1.6


01/02/18 12:49: WBC 6.3, RBC 4.61, Hgb 13.6 L, Hct 40.1 L, MCV 87.0  D, MCH 29.5

, MCHC 33.9, RDW 13.9, Plt Count 193, MPV 9.4, Gran % 77.0 H, Lymph % (Auto) 

16.3 L, Mono % (Auto) 6.5 H, Eos % (Auto) 0.2 L, Baso % (Auto) 0.0, Gran # 4.85

, Lymph # 1.0 L, Mono # 0.4, Eos # 0.0, Baso # 0.00











- RAD Interpretation


Narrative RAD Interpretations (Text): 


01/02/2018 14:29:52


PROCEDURE:  CT Chest with contrast (Pulmonary Angiogram)


FINDINGS: 


No pulmonary embolism. 


AORTA:


No acute findings. No thoracic aortic aneurysm. 


LUNGS:


There is a calcified granuloma in the right middle lobe. This is unchanged. The 

lungs are otherwise clear 


PLEURAL SPACES:


Unremarkable. No effusion or pneuomothorax. 


HEART:


Unremarkable. No cardiomegaly. No significant pericardial effusion. 


LYMPH NODES:


No lymphadenopathy.


IMPRESSION:


Unremarkable CT pulmonary angiogram. No pulmonary embolus.





01/02/2018 15:05:22


PROCEDURE:  CHEST RADIOGRAPH, 1 VIEW


LUNGS:


Clear.


PLEURA:


No pneumothorax or pleural fluid seen.


CARDIOVASCULAR:


Normal:


OSSEOUS STRUCTURES:


No significant abnormalities.


VISUALIZED UPPER ABDOMEN:


Normal.


OTHER FINDINGS:


None. 


IMPRESSION:


No active disease.


Radiology Orders: 








01/02/18 11:39


CHEST ONE VIEW [RAD] Stat 





01/02/18 11:42


DUPLEX LOWER EXTRM VEIN BILAT [US] Stat 





01/02/18 11:49


ANGIO CHEST PE PROTOCOL [CT] Stat 











: Radiologist





- EKG Interpretation


EKG Interpretation (Text): 


01/02/18 11:49


EKG:


Ordered, reviewed, and independently interpreted the EKG.


Rate : 55 BPM


Rhythm : Sinus Bradycardia


Interpretation : No ST-T changes.


Interpreted by ED Physician: Yes


Type: 12 lead EKG





- Medication Orders


Current Medication Orders: 











Discontinued Medications





Sodium Chloride (Sodium Chloride 0.9%)  1,000 mls @ 30 mls/hr IV .Q24H STA


   Stop: 01/03/18 11:38


   Last Admin: 01/02/18 12:52  Dose: 30 mls/hr





eMAR Start Stop


 Document     01/02/18 12:52  OCS  (Rec: 01/02/18 12:52  OCS  [facility] [id number]


     Intravenous Solution


      Start Date                                 01/02/18


      Start Time                                 12:52














- Scribe Statement


The provider has reviewed the documentation as recorded by the Scribe


Hector Joyner





All medical record entries made by the Scribe were at my direction and 

personally dictated by me. I have reviewed the chart and agree that the record 

accurately reflects my personal performance of the history, physical exam, 

medical decision making, and the department course for this patient. I have 

also personally directed, reviewed, and agree with the discharge instructions 

and disposition.





Disposition/Present on Arrival





- Present on Arrival


Any Indicators Present on Arrival: No


History of DVT/PE: Yes


History of Uncontrolled Diabetes: No


Urinary Catheter: No


History of Decub. Ulcer: No


History Surgical Site Infection Following: None





- Disposition


Have Diagnosis and Disposition been Completed?: Yes


Diagnosis: 


 Bronchitis, Smoker





Disposition: HOME/ ROUTINE


Disposition Time: 22:45


Patient Plan: Discharge


Condition: STABLE


Discharge Instructions (ExitCare):  Acute Bronchitis (ED)


Print Language: ENGLISH


Prescriptions: 


Levofloxacin [Levaquin] 500 mg PO DAILY #7 tablet


Referrals: 


Lamberto Yancey MD [Family Provider] - Follow up with primary


Forms:  [employer] (English)

## 2018-01-02 NOTE — CT
PROCEDURE:  CT Chest with contrast (Pulmonary Angiogram)



HISTORY:

r/o PE



COMPARISON:

08/15/2017 CT 



TECHNIQUE:

Axial computed tomography images were obtained of the chest in the 

pulmonary arterial phase of enhancement. Coronal and sagittal 

reformatted images were created and reviewed.



Intravenous contrast dose: 100 cc of Visipaque 320



Radiation dose:



Total exam DLP = 444 mGy-cm.



This CT exam was performed using one or more of the following dose 

reduction techniques: Automated exposure control, adjustment of the 

mA and/or kV according to patient size, and/or use of iterative 

reconstruction technique.



FINDINGS:



PULMONARY ARTERIES:

Unremarkable. No pulmonary embolism. 



AORTA:

No acute findings. No thoracic aortic aneurysm. 



LUNGS:

There is a calcified granuloma in the right middle lobe. This is 

unchanged. The lungs are otherwise clear 



PLEURAL SPACES:

Unremarkable. No effusion or pneuomothorax. 



HEART:

Unremarkable. No cardiomegaly. No significant pericardial effusion. 



LYMPH NODES:

No lymphadenopathy.



BONES, CHEST WALL:

Unremarkable. No fracture or destructive lesion 



OTHER FINDINGS:

Unremarkable. 



IMPRESSION:

Unremarkable CT pulmonary angiogram. No pulmonary embolus.

## 2018-01-02 NOTE — US
HISTORY:

Leg pain and swelling. Evaluate for DVT



PHYSICIAN(S):  Hua Townsend MD.



TECHNIQUE:

Duplex sonography and color-flow Doppler with graded compression were 

used to evaluate the deep venous systems of both lower extremities. 



FINDINGS:

The visualized deep venous systems of both lower extremities are 

sonographically normal and compressible. Normal wave forms and 

augmentation are seen. There is no sonographic evidence for deep 

venous thrombosis in the visualized segments of both lower 

extremities.



IMPRESSION:

No sonographic evidence for deep venous thrombosis in the visualized 

segments of both lower extremities.

## 2018-01-02 NOTE — RAD
PROCEDURE:  CHEST RADIOGRAPH, 1 VIEW



HISTORY:

sob



COMPARISON:

08/15/2017



FINDINGS:



LUNGS:

Clear.



PLEURA:

No pneumothorax or pleural fluid seen.



CARDIOVASCULAR:

Normal.



OSSEOUS STRUCTURES:

No significant abnormalities.



VISUALIZED UPPER ABDOMEN:

Normal.



OTHER FINDINGS:

None. 



IMPRESSION:

No active disease.

## 2018-01-02 NOTE — CARD
--------------- APPROVED REPORT --------------





EKG Measurement

Heart [id number]

PA [id number]

[id number]

[id number]

[id number]



<Conclusion>

Sinus bradycardia

Otherwise normal ECG